# Patient Record
Sex: FEMALE | Race: WHITE | Employment: OTHER | URBAN - NONMETROPOLITAN AREA
[De-identification: names, ages, dates, MRNs, and addresses within clinical notes are randomized per-mention and may not be internally consistent; named-entity substitution may affect disease eponyms.]

---

## 2020-12-02 LAB — SARS-COV-2: DETECTED

## 2020-12-25 ENCOUNTER — APPOINTMENT (OUTPATIENT)
Dept: GENERAL RADIOLOGY | Age: 75
DRG: 853 | End: 2020-12-25
Attending: INTERNAL MEDICINE
Payer: MEDICARE

## 2020-12-25 ENCOUNTER — APPOINTMENT (OUTPATIENT)
Dept: CT IMAGING | Age: 75
DRG: 853 | End: 2020-12-25
Attending: INTERNAL MEDICINE
Payer: MEDICARE

## 2020-12-25 ENCOUNTER — ANESTHESIA EVENT (OUTPATIENT)
Dept: OPERATING ROOM | Age: 75
DRG: 853 | End: 2020-12-25
Payer: MEDICARE

## 2020-12-25 ENCOUNTER — ANESTHESIA (OUTPATIENT)
Dept: OPERATING ROOM | Age: 75
DRG: 853 | End: 2020-12-25
Payer: MEDICARE

## 2020-12-25 ENCOUNTER — HOSPITAL ENCOUNTER (INPATIENT)
Age: 75
LOS: 5 days | Discharge: HOME | DRG: 853 | End: 2020-12-30
Attending: INTERNAL MEDICINE | Admitting: INTERNAL MEDICINE
Payer: MEDICARE

## 2020-12-25 VITALS — DIASTOLIC BLOOD PRESSURE: 68 MMHG | OXYGEN SATURATION: 96 % | SYSTOLIC BLOOD PRESSURE: 150 MMHG

## 2020-12-25 DIAGNOSIS — R65.21 SEPTIC SHOCK (HCC): Primary | ICD-10-CM

## 2020-12-25 DIAGNOSIS — A41.9 SEPTIC SHOCK (HCC): Primary | ICD-10-CM

## 2020-12-25 LAB
ACT TEG: 128 SECONDS (ref 86–118)
ALBUMIN SERPL-MCNC: 2.8 G/DL (ref 3.5–5.1)
ALP BLD-CCNC: 119 U/L (ref 38–126)
ALT SERPL-CCNC: 23 U/L (ref 11–66)
ANGLE, RAPID TEG: 71 DEG (ref 64–80)
ANION GAP SERPL CALCULATED.3IONS-SCNC: 13 MEQ/L (ref 8–16)
AST SERPL-CCNC: 33 U/L (ref 5–40)
BASOPHILS # BLD: 0.3 %
BASOPHILS ABSOLUTE: 0 THOU/MM3 (ref 0–0.1)
BILIRUB SERPL-MCNC: 1.2 MG/DL (ref 0.3–1.2)
BILIRUBIN DIRECT: 0.9 MG/DL (ref 0–0.3)
BUN BLDV-MCNC: 51 MG/DL (ref 7–22)
CALCIUM SERPL-MCNC: 7.6 MG/DL (ref 8.5–10.5)
CHLORIDE BLD-SCNC: 103 MEQ/L (ref 98–111)
CO2: 18 MEQ/L (ref 23–33)
CORTISOL: 35.07 UG/DL
CREAT SERPL-MCNC: 3.1 MG/DL (ref 0.4–1.2)
DIFFERENTIAL TYPE: ABNORMAL
DIRECT COOMBS: NORMAL
EKG ATRIAL RATE: 88 BPM
EKG P AXIS: 7 DEGREES
EKG P-R INTERVAL: 146 MS
EKG Q-T INTERVAL: 382 MS
EKG QRS DURATION: 86 MS
EKG QTC CALCULATION (BAZETT): 462 MS
EKG R AXIS: 49 DEGREES
EKG T AXIS: 52 DEGREES
EKG VENTRICULAR RATE: 88 BPM
EOSINOPHIL # BLD: 0.5 %
EOSINOPHILS ABSOLUTE: 0 THOU/MM3 (ref 0–0.4)
EPL-TEG: 0 % (ref 0–15)
ERYTHROCYTE [DISTWIDTH] IN BLOOD BY AUTOMATED COUNT: 12.6 % (ref 11.5–14.5)
ERYTHROCYTE [DISTWIDTH] IN BLOOD BY AUTOMATED COUNT: 46.7 FL (ref 35–45)
FIBRINOGEN: 350 MG/100ML (ref 155–475)
GFR SERPL CREATININE-BSD FRML MDRD: 15 ML/MIN/1.73M2
GLUCOSE BLD-MCNC: 119 MG/DL (ref 70–108)
HCT VFR BLD CALC: 35.1 % (ref 37–47)
HEMOGLOBIN: 11.2 GM/DL (ref 12–16)
HEPARIN THERAPY: NO
IMMATURE GRANS (ABS): 0.98 THOU/MM3 (ref 0–0.07)
IMMATURE GRANULOCYTES: 25 %
KINETICS RAPID TEG: 2.5 MINUTES (ref 0.5–2.3)
LACTIC ACID, SEPSIS: 1.3 MMOL/L (ref 0.5–1.9)
LACTIC ACID, SEPSIS: 2.7 MMOL/L (ref 0.5–1.9)
LACTIC ACID: 3.2 MMOL/L (ref 0.5–2.2)
LD: 244 U/L (ref 100–190)
LIPASE: 4.8 U/L (ref 5.6–51.3)
LY30 (LYSIS) TEG: 0 % (ref 0–7.5)
LYMPHOCYTES # BLD: 2.8 %
LYMPHOCYTES ABSOLUTE: 0.1 THOU/MM3 (ref 1–4.8)
MA(MAX CLOT) RAPID TEG: 45.7 MM (ref 52–71)
MAGNESIUM: 1.5 MG/DL (ref 1.6–2.4)
MCH RBC QN AUTO: 32.3 PG (ref 26–33)
MCHC RBC AUTO-ENTMCNC: 31.9 GM/DL (ref 32.2–35.5)
MCV RBC AUTO: 101.2 FL (ref 81–99)
MONOCYTES # BLD: 2 %
MONOCYTES ABSOLUTE: 0.1 THOU/MM3 (ref 0.4–1.3)
MRSA SCREEN RT-PCR: POSITIVE
NUCLEATED RED BLOOD CELLS: 0 /100 WBC
PATHOLOGIST REVIEW: ABNORMAL
PHOSPHORUS: 4.3 MG/DL (ref 2.4–4.7)
PLATELET # BLD: 31 THOU/MM3 (ref 130–400)
PLATELET ESTIMATE: ABNORMAL
PMV BLD AUTO: 11 FL (ref 9.4–12.4)
POTASSIUM SERPL-SCNC: 3.6 MEQ/L (ref 3.5–5.2)
PROCALCITONIN: 94.67 NG/ML (ref 0.01–0.09)
RBC # BLD: 3.47 MILL/MM3 (ref 4.2–5.4)
REACTION TIME RAPID TEG: 0.8 MINUTES (ref 0.4–1)
REVIEWED BY: NORMAL
SCAN OF BLOOD SMEAR: NORMAL
SEG NEUTROPHILS: 69.4 %
SEGMENTED NEUTROPHILS ABSOLUTE COUNT: 2.7 THOU/MM3 (ref 1.8–7.7)
SMEAR REVIEW: NORMAL
SODIUM BLD-SCNC: 134 MEQ/L (ref 135–145)
TOTAL PROTEIN: 4.5 G/DL (ref 6.1–8)
VANCOMYCIN RESISTANT ENTEROCOCCUS: NEGATIVE
WBC # BLD: 3.9 THOU/MM3 (ref 4.8–10.8)

## 2020-12-25 PROCEDURE — 93010 ELECTROCARDIOGRAM REPORT: CPT | Performed by: NUCLEAR MEDICINE

## 2020-12-25 PROCEDURE — 2700000000 HC OXYGEN THERAPY PER DAY

## 2020-12-25 PROCEDURE — 85025 COMPLETE CBC W/AUTO DIFF WBC: CPT

## 2020-12-25 PROCEDURE — 85385 FIBRINOGEN ANTIGEN: CPT

## 2020-12-25 PROCEDURE — 2000000000 HC ICU R&B

## 2020-12-25 PROCEDURE — 83735 ASSAY OF MAGNESIUM: CPT

## 2020-12-25 PROCEDURE — C2617 STENT, NON-COR, TEM W/O DEL: HCPCS | Performed by: UROLOGY

## 2020-12-25 PROCEDURE — 3600000013 HC SURGERY LEVEL 3 ADDTL 15MIN: Performed by: UROLOGY

## 2020-12-25 PROCEDURE — C1769 GUIDE WIRE: HCPCS | Performed by: UROLOGY

## 2020-12-25 PROCEDURE — 2580000003 HC RX 258: Performed by: NURSE ANESTHETIST, CERTIFIED REGISTERED

## 2020-12-25 PROCEDURE — 83010 ASSAY OF HAPTOGLOBIN QUANT: CPT

## 2020-12-25 PROCEDURE — 3600000003 HC SURGERY LEVEL 3 BASE: Performed by: UROLOGY

## 2020-12-25 PROCEDURE — 6360000002 HC RX W HCPCS: Performed by: NURSE PRACTITIONER

## 2020-12-25 PROCEDURE — 3700000000 HC ANESTHESIA ATTENDED CARE: Performed by: UROLOGY

## 2020-12-25 PROCEDURE — 36591 DRAW BLOOD OFF VENOUS DEVICE: CPT

## 2020-12-25 PROCEDURE — 87077 CULTURE AEROBIC IDENTIFY: CPT

## 2020-12-25 PROCEDURE — 80048 BASIC METABOLIC PNL TOTAL CA: CPT

## 2020-12-25 PROCEDURE — 87641 MR-STAPH DNA AMP PROBE: CPT

## 2020-12-25 PROCEDURE — 0T778DZ DILATION OF LEFT URETER WITH INTRALUMINAL DEVICE, VIA NATURAL OR ARTIFICIAL OPENING ENDOSCOPIC: ICD-10-PCS | Performed by: INTERNAL MEDICINE

## 2020-12-25 PROCEDURE — 83615 LACTATE (LD) (LDH) ENZYME: CPT

## 2020-12-25 PROCEDURE — 2500000003 HC RX 250 WO HCPCS: Performed by: NURSE PRACTITIONER

## 2020-12-25 PROCEDURE — 84100 ASSAY OF PHOSPHORUS: CPT

## 2020-12-25 PROCEDURE — 2709999900 HC NON-CHARGEABLE SUPPLY: Performed by: UROLOGY

## 2020-12-25 PROCEDURE — 6360000002 HC RX W HCPCS: Performed by: NURSE ANESTHETIST, CERTIFIED REGISTERED

## 2020-12-25 PROCEDURE — 71045 X-RAY EXAM CHEST 1 VIEW: CPT

## 2020-12-25 PROCEDURE — 6370000000 HC RX 637 (ALT 250 FOR IP): Performed by: NURSE PRACTITIONER

## 2020-12-25 PROCEDURE — 83605 ASSAY OF LACTIC ACID: CPT

## 2020-12-25 PROCEDURE — 87086 URINE CULTURE/COLONY COUNT: CPT

## 2020-12-25 PROCEDURE — 2580000003 HC RX 258: Performed by: NURSE PRACTITIONER

## 2020-12-25 PROCEDURE — 87081 CULTURE SCREEN ONLY: CPT

## 2020-12-25 PROCEDURE — APPNB180 APP NON BILLABLE TIME > 60 MINS: Performed by: NURSE PRACTITIONER

## 2020-12-25 PROCEDURE — 3700000001 HC ADD 15 MINUTES (ANESTHESIA): Performed by: UROLOGY

## 2020-12-25 PROCEDURE — 74176 CT ABD & PELVIS W/O CONTRAST: CPT

## 2020-12-25 PROCEDURE — 83690 ASSAY OF LIPASE: CPT

## 2020-12-25 PROCEDURE — 99291 CRITICAL CARE FIRST HOUR: CPT | Performed by: INTERNAL MEDICINE

## 2020-12-25 PROCEDURE — 87500 VANOMYCIN DNA AMP PROBE: CPT

## 2020-12-25 PROCEDURE — 82533 TOTAL CORTISOL: CPT

## 2020-12-25 PROCEDURE — 84145 PROCALCITONIN (PCT): CPT

## 2020-12-25 PROCEDURE — 93005 ELECTROCARDIOGRAM TRACING: CPT | Performed by: NURSE PRACTITIONER

## 2020-12-25 PROCEDURE — 2500000003 HC RX 250 WO HCPCS: Performed by: NURSE ANESTHETIST, CERTIFIED REGISTERED

## 2020-12-25 PROCEDURE — 87040 BLOOD CULTURE FOR BACTERIA: CPT

## 2020-12-25 PROCEDURE — 87147 CULTURE TYPE IMMUNOLOGIC: CPT

## 2020-12-25 PROCEDURE — 71250 CT THORAX DX C-: CPT

## 2020-12-25 PROCEDURE — 80076 HEPATIC FUNCTION PANEL: CPT

## 2020-12-25 PROCEDURE — 86880 COOMBS TEST DIRECT: CPT

## 2020-12-25 PROCEDURE — 94761 N-INVAS EAR/PLS OXIMETRY MLT: CPT

## 2020-12-25 PROCEDURE — 87186 SC STD MICRODIL/AGAR DIL: CPT

## 2020-12-25 DEVICE — URETERAL STENT
Type: IMPLANTABLE DEVICE | Status: FUNCTIONAL
Brand: PERCUFLEX™ PLUS

## 2020-12-25 RX ORDER — 0.9 % SODIUM CHLORIDE 0.9 %
2000 INTRAVENOUS SOLUTION INTRAVENOUS ONCE
Status: COMPLETED | OUTPATIENT
Start: 2020-12-25 | End: 2020-12-25

## 2020-12-25 RX ORDER — GLYCOPYRROLATE 1 MG/5 ML
SYRINGE (ML) INTRAVENOUS PRN
Status: DISCONTINUED | OUTPATIENT
Start: 2020-12-25 | End: 2020-12-25 | Stop reason: SDUPTHER

## 2020-12-25 RX ORDER — SODIUM CHLORIDE 9 MG/ML
INJECTION, SOLUTION INTRAVENOUS CONTINUOUS PRN
Status: DISCONTINUED | OUTPATIENT
Start: 2020-12-25 | End: 2020-12-25 | Stop reason: SDUPTHER

## 2020-12-25 RX ORDER — HEPARIN SODIUM 5000 [USP'U]/ML
5000 INJECTION, SOLUTION INTRAVENOUS; SUBCUTANEOUS 2 TIMES DAILY
Status: DISCONTINUED | OUTPATIENT
Start: 2020-12-25 | End: 2020-12-25

## 2020-12-25 RX ORDER — KETAMINE HYDROCHLORIDE 50 MG/ML
INJECTION, SOLUTION, CONCENTRATE INTRAMUSCULAR; INTRAVENOUS PRN
Status: DISCONTINUED | OUTPATIENT
Start: 2020-12-25 | End: 2020-12-25 | Stop reason: SDUPTHER

## 2020-12-25 RX ORDER — ACETAMINOPHEN 325 MG/1
650 TABLET ORAL EVERY 6 HOURS PRN
Status: DISCONTINUED | OUTPATIENT
Start: 2020-12-25 | End: 2020-12-30 | Stop reason: HOSPADM

## 2020-12-25 RX ORDER — MIDAZOLAM HYDROCHLORIDE 1 MG/ML
INJECTION INTRAMUSCULAR; INTRAVENOUS PRN
Status: DISCONTINUED | OUTPATIENT
Start: 2020-12-25 | End: 2020-12-25 | Stop reason: SDUPTHER

## 2020-12-25 RX ORDER — ONDANSETRON 2 MG/ML
4 INJECTION INTRAMUSCULAR; INTRAVENOUS EVERY 6 HOURS PRN
Status: DISCONTINUED | OUTPATIENT
Start: 2020-12-25 | End: 2020-12-30 | Stop reason: HOSPADM

## 2020-12-25 RX ORDER — POLYETHYLENE GLYCOL 3350 17 G/17G
17 POWDER, FOR SOLUTION ORAL DAILY PRN
Status: DISCONTINUED | OUTPATIENT
Start: 2020-12-25 | End: 2020-12-30 | Stop reason: HOSPADM

## 2020-12-25 RX ORDER — ACETAMINOPHEN 650 MG/1
650 SUPPOSITORY RECTAL EVERY 6 HOURS PRN
Status: DISCONTINUED | OUTPATIENT
Start: 2020-12-25 | End: 2020-12-30 | Stop reason: HOSPADM

## 2020-12-25 RX ORDER — DEXAMETHASONE SODIUM PHOSPHATE 10 MG/ML
10 INJECTION, SOLUTION INTRAMUSCULAR; INTRAVENOUS ONCE
Status: COMPLETED | OUTPATIENT
Start: 2020-12-25 | End: 2020-12-25

## 2020-12-25 RX ORDER — ONDANSETRON 4 MG/1
4 TABLET, FILM COATED ORAL EVERY 8 HOURS PRN
Status: ON HOLD | COMMUNITY
End: 2020-12-28 | Stop reason: ALTCHOICE

## 2020-12-25 RX ORDER — PROMETHAZINE HYDROCHLORIDE 25 MG/1
12.5 TABLET ORAL EVERY 6 HOURS PRN
Status: DISCONTINUED | OUTPATIENT
Start: 2020-12-25 | End: 2020-12-30 | Stop reason: HOSPADM

## 2020-12-25 RX ORDER — HYDROCODONE BITARTRATE AND ACETAMINOPHEN 5; 325 MG/1; MG/1
1 TABLET ORAL EVERY 6 HOURS PRN
Status: ON HOLD | COMMUNITY
End: 2020-12-28 | Stop reason: ALTCHOICE

## 2020-12-25 RX ORDER — CARVEDILOL 3.12 MG/1
3.12 TABLET ORAL 2 TIMES DAILY WITH MEALS
Status: ON HOLD | COMMUNITY
End: 2020-12-30 | Stop reason: HOSPADM

## 2020-12-25 RX ADMIN — Medication 0.2 MG: at 07:41

## 2020-12-25 RX ADMIN — VASOPRESSIN 0.04 UNITS/MIN: 20 INJECTION INTRAVENOUS at 19:50

## 2020-12-25 RX ADMIN — SODIUM CHLORIDE: 9 INJECTION, SOLUTION INTRAVENOUS at 07:41

## 2020-12-25 RX ADMIN — SODIUM CHLORIDE 2000 ML: 9 INJECTION, SOLUTION INTRAVENOUS at 02:45

## 2020-12-25 RX ADMIN — ACETAMINOPHEN 650 MG: 325 TABLET ORAL at 23:34

## 2020-12-25 RX ADMIN — KETAMINE HYDROCHLORIDE 10 MG: 50 INJECTION, SOLUTION INTRAMUSCULAR; INTRAVENOUS at 07:41

## 2020-12-25 RX ADMIN — Medication 40 MCG/MIN: at 02:46

## 2020-12-25 RX ADMIN — DEXAMETHASONE SODIUM PHOSPHATE 10 MG: 10 INJECTION, SOLUTION INTRAMUSCULAR; INTRAVENOUS at 06:58

## 2020-12-25 RX ADMIN — VASOPRESSIN 0.04 UNITS/MIN: 20 INJECTION INTRAVENOUS at 02:46

## 2020-12-25 RX ADMIN — PIPERACILLIN AND TAZOBACTAM 3.38 G: 3; .375 INJECTION, POWDER, LYOPHILIZED, FOR SOLUTION INTRAVENOUS at 13:42

## 2020-12-25 RX ADMIN — MIDAZOLAM HYDROCHLORIDE 1 MG: 1 INJECTION, SOLUTION INTRAMUSCULAR; INTRAVENOUS at 07:41

## 2020-12-25 ASSESSMENT — PULMONARY FUNCTION TESTS
PIF_VALUE: 1
PIF_VALUE: 0
PIF_VALUE: 1
PIF_VALUE: 1
PIF_VALUE: 0
PIF_VALUE: 1
PIF_VALUE: 0
PIF_VALUE: 1
PIF_VALUE: 0
PIF_VALUE: 1
PIF_VALUE: 0
PIF_VALUE: 1
PIF_VALUE: 0
PIF_VALUE: 1
PIF_VALUE: 0

## 2020-12-25 NOTE — FLOWSHEET NOTE
Report called to 4D. Transferred to 4D04 per bed with RN, monitor, O2 and IV's. 4D nurse states will call pt's daughter on arrival so pt can talk to her.

## 2020-12-25 NOTE — CONSULTS
Ulises Kauffman MD  Urology Consultation    Patient:  Gerard Grajeda  MRN: [id number]  YOB: 1945    CHIEF COMPLAINT:  ureteral stone    HISTORY OF PRESENT ILLNESS:     The patient is a 67 y.o. female who presents with sepsis . Urology consulted for ureteral stone    Onset was days ago  Overall, the problem(s) are worse. Severity is described as profound. Associated Symptoms: No dysuria, no gross hematuria. Current Pain Severity: 4    Left flank pain  Transfer from [place]  Films not available but report is  Septic left ureteral stones      Patient's old records reviewed. Pertinent patient notes and patient chart reviewed and summarized above.     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        Past Surgical History:    Past Surgical History:   Procedure Laterality Date    MASTECTOMY Right 01/15/2019    MASTECTOMY Left 04/07/2020       Medications:    Scheduled Meds:   piperacillin-tazobactam  3.375 g Intravenous Q12H     Continuous Infusions:   norepinephrine 32 mcg/min (12/25/20 0713)    vasopressin (Septic Shock) infusion 0.04 Units/min (12/25/20 0246)     PRN Meds:.promethazine **OR** ondansetron, polyethylene glycol, acetaminophen **OR** acetaminophen    Allergies:  Bacitracin and Bactrim [sulfamethoxazole-trimethoprim]    Social History:    Social History     Socioeconomic History    Marital status:      Spouse name: Not on file    Number of children: Not on file    Years of education: Not on file    Highest education level: Not on file   Occupational History    Not on file   Social Needs    Financial resource strain: Not on file    Food insecurity     Worry: Not on file     Inability: Not on file    Transportation needs     Medical: Not on file     Non-medical: Not on file   Tobacco Use    Smoking status: Not on file   Substance and Sexual Activity    Alcohol use: Not on file 12/25/20 0300 (!) 102/58   92 14 100 %     12/25/20 0255 (!) 95/47   94 15 99 %     12/25/20 0250 (!) 96/50   91 16 96 %     12/25/20 0245 (!) 90/49   95 17 96 %     12/25/20 0240 (!) 92/48   104 18      12/25/20 0230 98/62     (!) 89 %     12/25/20 0220 (!) 105/52   112 18 93 %     12/25/20 0215 99/64   109 17 91 %     12/25/20 0213 (!) 92/51   106 17 90 %     12/25/20 0210 (!) 95/51   114 27 93 %     12/25/20 0205 101/65   114 24 90 %     12/25/20 0152 (!) 102/47 99.9 °F (37.7 °C) Oral 117 18 94 %  185 lb 6.5 oz (84.1 kg)     Constitutional: Patient in no acute distress. Neuro: Alert and oriented to person, place and time. Psych: mood and affect normal  HEENT negative  Lungs: Respiratory effort is normal  Cardiovascular: Normal peripheral pulses. Regular rate  Abdomen: Soft, non-tender, non-distended with no CVA, flank pain or hepatosplenomegaly. No hernias. Kidneys normal.  Lymphatics: No palpable lymphadenopathy. Bladder non-tender and not distended. Pelvic exam: deferred  Rectal exam not indicated  Minimal/no edema in bilateral lower extremities. LABS:   Recent Labs     12/25/20  0510   WBC 3.9*   HGB 11.2*   HCT 35.1*   .2*   PLT 31*     Recent Labs     12/25/20  0510   *   K 3.6      CO2 18*   BUN 51*   CREATININE 3.1*       Additional Lab/Culture results: none    Urinalysis: No results for input(s): COLORU, PHUR, LABCAST, WBCUA, RBCUA, MUCUS, TRICHOMONAS, YEAST, BACTERIA, CLARITYU, SPECGRAV, LEUKOCYTESUR, UROBILINOGEN, BILIRUBINUR, BLOODU in the last 72 hours.     Invalid input(s): Lucien Moreno     -----------------------------------------------------------------  Imaging Reviewed during this encounter:   Rohith Schwartz MD independently reviewed the images and verified the radiology reports from:      CT REPORT- left ureteral stones near UVJ    Xr Chest Portable    Result Date: 12/25/2020

## 2020-12-25 NOTE — PROGRESS NOTES
Mauricio,Dr Nelson. Pyelonephritis,septic shock on levophed, not intubated. Ct Abd. 2mm stone distal L ureter L hydronephrosis. Creatinine 3.76, was 0.84 yesterday. Lactate 3.3, Trop 0.38, PLT 53, WBC 3.6 29% bands. Pt has a mediport,currently getting radiation at [facility] for breast CA. AMS,weak,slow to respond. SBP was in 60's, now on levo 92/54, , T 99.2.  Spoke with Dr Ruth Feliciano for urology, he plans to take pt to OR at 6AM.

## 2020-12-25 NOTE — OP NOTE
Patient:  Oneyda Urbina  MRN: [id number]  YOB: 1945    FACILITY: [place], [place]    DATE: 12/25/2020    SURGEON: Terrance Ojeda MD     ASSISTANT: none    PREOPERATIVE DIAGNOSIS: kidney stone    POSTOPERATIVE DIAGNOSIS: kidney stone    PROCEDURE PERFORMED:     1. Cystourethroscopy  2. Left stent placement    ANESTHESIA: mac    ESTIMATED BLOOD LOSS: 0 ml    COMPLICATIONS: None immediate    DRAINS: 6 x 26 double j stent    SPECIMENS: none    INDICATIONS FOR PROCEDURE:  The patient is a 67 y.o. female who presents today with kidney stone here for CYSTOSCOPY URETERAL STENT INSERTION. After risks, benefits and alternatives of the procedure were discussed with the patient, the patient elected to proceed. DETAILS OF THE PROCEDURE:  The patient was brought back to the preoperative holding area to the operating suite, and was transferred to the operating table where the patient lay in supine position. General endotracheal anesthesia was induced, and patient was prepped and draped in standard surgical fashion after being placed in dorsolithotomy position. A proper timeout was performed, preoperative antibiotics were given. A rigid cystoscope with a 22 Swedish sheath with 30 degree lens was inserted in the patient's urethral and into the bladder with ease. We then focused our attention on the Left ureteral orifice, which we cannulated with our glidewire. Over our glidewire we placed a   6x 26 stent and we noted appropriate placement in the upper collecting system using fluoroscopy. We then removed our wire. There was good proximal and distal curl. We did not leave the string at the end of the stent. We then drained the patient's bladder and removed the scope and the procedure was subsequently terminated. I was present and scrubbed for all critical portions of the procedure.     Plan:   The patient will be monitored closely in ICU

## 2020-12-25 NOTE — PROGRESS NOTES
Patient arrived to unit from ED via EMS and ER nurse. Patient transferred to ICU bed and placed on continuous ICU bedside monitor. Patient admitted for Severe sepsis with septic shock [R65.21]. Vitals obtained. See flowsheets. Patient's IV access includes mediport and piv x2. Current infusions and rates of infusion include levo 30. Assessment completed by Tayler Raman. Two nurse skin assessment completed by Tayler Raman and Kathleen Hall RN. See flowsheets for assessment details. Policies and procedures of ICU able to be explained to patient at this time. Family member(s)/representative(s) present at time of admission include family updated via phone, daughter and . Unable to be at bedside d/t COVID isolation. Patient rights explained to family member(s)/representatives and patient, as able. Patient/patient's family member(s)/representative(s) Requested to have physician notified of their admission. All questions posed by patient's family member(s)/representative(s) and patient answered at this time.

## 2020-12-25 NOTE — H&P
CRITICAL CARE H+P NOTE      Patient:  Niharika Oneal    Unit/Bed:3A-09/009-A  YOB: 1945  MRN: [id number]   PCP: Oralia Reyez MD  Date of Admission: 12/25/2020  Chief Complaint:-Hypotension, decreased urinary output    Assessment and Plan:    1. Distributive shock:  Patient was given 0.9NS 30/kg bolus with no improvement in blood pressure. Patient did require Levophed and Vasopressin to maintain MAP >65.   2. Acute pyelonephritis:  12/23/2020 CT of ABD/PELVIS  Moderate left perinephric edema, Urine Culture and Blood Culture is pending. Patient did receive Vancomycin and Zosyn at outlying facility prior to transfer, will continue Zosyn. 3. Left ureterolithiasis with hydronephrosis/hydroureter:  CT of ABD/Pelvis 12/23/2020 multiple calculi within the distal left ureter and within the left ureterovesicular junction each measuring about 2 mm. Mild to moderate left hydronephrosis and moderate left perinephric edema. Urology has been consulted, anticipated OR intervention. 4. Incidental COVID-19:  Positive. Patient is telling she tested positive in the past?. Continue to monitor. 5. Acute kidney injury: oliguric, ATN vs obstructive , Amaya was placed at outlying facility. Persistent hypotension secondary to sepsis. Urine is pending. Will need to dose medications to GFR, no NSAIDS to be given. May need Nephrology input. 6. Elevated troponin:  In the setting of JOEL and Septic Shock, likely demand ischemia. EKG no STEMI, repeat troponin and EKG are pending. 7. Acute thrombocytopenia:  Multifactorial likely secondary to sepsis will need to rule out DIC, or TTP  8. Anemia, macrocytis:  Monitor and trend  9. Breast cancer, history:  Stage II, history of mediport, and left modified radical mastectomy on 4/7/2020 and adjunct chemo, she is currently receiving radiation  10.  Obesity:  BMI 30.8      INITIAL H AND P AND ICU COURSE: Social History: Lifetime non-smoker, denies any alcohol or illicit drug use. Patient is  lives with . ROS   GENERAL: Positive for fever, fatigue,   SKN: No lesions or rashes. HEAD: No headaches or recent injury  EYES: No acute changes in vision, no diplopia or blurred vision  EARS: No hearing loss, no tinnitus  NOSE/THROAT: No rhinorrhea or pharyngitis, no nasal drainage  NECK: No lumps or unusual neck stiffness  PULMONARY: No dyspnea, orthopnea or paroxysmal nocturnal dyspnea, stridor or wheezing  CARDIAC: Positive for hypotension and tachycardia, No chest pain, pressure,   GI: Positive for CVA tenderness, No history melena or hematochezia, no diarrhea or constipation  : no hematuria,   PERIPHERAL VASCULAR: No intermittent claudication or unusual leg cramps  MUSCULOSKELETAL: Occasional arthralgias, myalgias  NEUROLOGICAL: Positive for confusion, No Seizures or Syncope  HEMATOLOGIC:  No unusual bruising or bleeding  PSYCH: No homicidal or suicidal ideation. Scheduled Meds:   sodium chloride  2,000 mL Intravenous Once     Continuous Infusions:   norepinephrine      vasopressin (Septic Shock) infusion         PHYSICAL EXAMINATION:  T: 99.9.  P: 117. RR: 22. B/P: 95/51. FiO2: 2 L. O2 Sat: 95.  I/O: Pending  There is no height or weight on file to calculate BMI.   GCS:   12  General:   Pale acutely ill in appearance  HEENT:  normocephalic and atraumatic. No scleral icterus. PERR, dry mucous membranes  Neck: supple. No Thyromegaly. Lungs: clear to auscultation. No retractions, positive for left upper chest MediPort intact. Positive for right breast implant  Cardiac: RRR-tachycardic. No JVD. Abdomen: soft. Nontender. Extremities:  No clubbing, cyanosis, or edema x 4. Vasculature: capillary refill < 3 seconds. Palpable dorsalis pedis pulses. Skin:  warm and dry. Psych:  Alert and oriented x3. Affect appropriate  Lymph:  No supraclavicular adenopathy. Neurologic:  No focal deficit. No seizures. Data: (All radiographs, tracings, PFTs, and imaging are personally viewed and interpreted unless otherwise noted). ? 12/24/2020 information obtained from outlying facility  ? Urine-cloudy negative for glucose negative for ketones 1+ blood 1+ leukocytes  WBCs 3+ bacteria  ? Urine drug screen negative  ? EtOH less than 3.0  ? Glucose 118 BUN 37 creatinine is 3.76  ? Sodium 133 potassium 3.5 chloride 78 CO2 24 calcium is 8.5  ? White count 3.6 hemoglobin is 12.8 hematocrit is 39.1 platelet count is 53  ? Troponin 0 0.38  ? INR 1.2  ? Total bili is 1.0 albumin is 2.6 alk phos is 321 AST 40 ALT 37  ? Lactic acid is 3.3  ? CT head no acute intracranial abnormality  ? Chest x-ray asymmetric right mid and lower lung zone hazy opacity may relate to overlying right breast tissue implant  ? EKG sinus tachycardia heart rate 105 ID is [id number] QTC is 486 with no acute ST or T wave abnormality  ? Cardiac telemetry sinus tachycardia        Seen with multidisciplinary ICU team yes. Meets Continued ICU Level Care Criteria:    [x] Yes   [] No - Transfer Planned to listed location:  [] HOSPITALIST CONTACTED-      Case and plan discussed with Dr. Deepthi Wright.         Electronically signed by SHAYLA Schneider - [facility]  CRITICAL CARE SPECIALIST

## 2020-12-25 NOTE — ANESTHESIA PRE PROCEDURE
Department of Anesthesiology  Preprocedure Note       Name:  Jasson Saini   Age:  67 y.o.  :  1945                                          MRN:  [id number]         Date:  2020      Surgeon: Ivory Leahy):  Vinod Delaney MD    Procedure: Procedure(s):  CYSTOSCOPY URETERAL STENT INSERTION    Medications prior to admission:   Prior to Admission medications    Medication Sig Start Date End Date Taking? Authorizing Provider   carvedilol (COREG) 3.125 MG tablet Take 3.125 mg by mouth 2 times daily (with meals)   Yes Historical Provider, MD   ondansetron (ZOFRAN) 4 MG tablet Take 4 mg by mouth every 8 hours as needed for Nausea or Vomiting   Yes Historical Provider, MD   HYDROcodone-acetaminophen (NORCO) 5-325 MG per tablet Take 1 tablet by mouth every 6 hours as needed for Pain.    Yes Historical Provider, MD       Current medications:    Current Facility-Administered Medications   Medication Dose Route Frequency Provider Last Rate Last Admin    norepinephrine (LEVOPHED) 16 mg in dextrose 5% 250 mL infusion  2 mcg/min Intravenous Continuous SHAYLA Simms - CNP 30 mL/hr at 20 0713 32 mcg/min at 20 0713    vasopressin 20 Units in dextrose 5 % 100 mL infusion  0.04 Units/min Intravenous Continuous SHAYLA Simms - CNP 12 mL/hr at 20 0246 0.04 Units/min at 20 0246    promethazine (PHENERGAN) tablet 12.5 mg  12.5 mg Oral Q6H PRN SHAYLA Simms CNP        Or    ondansetron (ZOFRAN) injection 4 mg  4 mg Intravenous Q6H PRN Keira Gutierres APRN - CNP        polyethylene glycol (GLYCOLAX) packet 17 g  17 g Oral Daily PRN SHAYLA Simms - CNP        acetaminophen (TYLENOL) tablet 650 mg  650 mg Oral Q6H PRN SHAYLA Simms CNP        Or    acetaminophen (TYLENOL) suppository 650 mg  650 mg Rectal Q6H PRN SHAYLA Simms - ANICETO  piperacillin-tazobactam (ZOSYN) 3.375 g in dextrose 5 % 50 mL IVPB extended infusion (mini-bag)  3.375 g Intravenous Q12H SHAYLA Keith CNP           Allergies: Allergies   Allergen Reactions    Bacitracin     Bactrim [Sulfamethoxazole-Trimethoprim]        Problem List:    Patient Active Problem List   Diagnosis Code    [facility] A41.9, R65.21       Past Medical History:        Diagnosis Date    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        Past Surgical History:        Procedure Laterality Date    MASTECTOMY Right 01/15/2019    MASTECTOMY Left 04/07/2020       Social History:    Social History     Tobacco Use    Smoking status: Not on file   Substance Use Topics    Alcohol use: Not on file                                Counseling given: Not Answered      Vital Signs (Current):   Vitals:    12/25/20 0545 12/25/20 0600 12/25/20 0617 12/25/20 0630   BP: 118/65 (!) 96/57 106/74 126/72   Pulse: 81 93 95 88   Resp: 14 18 16 22   Temp:       TempSrc:       SpO2: 96% 93% 92% 93%   Weight:       Height:                                                  BP Readings from Last 3 Encounters:   12/25/20 126/72       NPO Status:                                                                                 BMI:   Wt Readings from Last 3 Encounters:   12/25/20 185 lb 6.5 oz (84.1 kg)     Body mass index is 30.85 kg/m². CBC:   Lab Results   Component Value Date    WBC 3.9 12/25/2020    RBC 3.47 12/25/2020    HGB 11.2 12/25/2020    HCT 35.1 12/25/2020    .2 12/25/2020    PLT 31 12/25/2020       CMP:   Lab Results   Component Value Date     12/25/2020    K 3.6 12/25/2020     12/25/2020    CO2 18 12/25/2020    BUN 51 12/25/2020    CREATININE 3.1 12/25/2020    LABGLOM 15 12/25/2020    GLUCOSE 119 12/25/2020    CALCIUM 7.6 12/25/2020       POC Tests: No results for input(s): POCGLU, POCNA, POCK, POCCL, POCBUN, POCHEMO, POCHCT in the last 72 hours. Coags: No results found for: PROTIME, INR, APTT    HCG (If Applicable): No results found for: PREGTESTUR, PREGSERUM, HCG, HCGQUANT     ABGs: No results found for: PHART, PO2ART, OIX4ZKB, SFA7ZXC, BEART, Y0QJONDX     Type & Screen (If Applicable):  No results found for: LABABO, LABRH    Drug/Infectious Status (If Applicable):  No results found for: HIV, HEPCAB    COVID-19 Screening (If Applicable): No results found for: COVID19      Anesthesia Evaluation    Airway: Mallampati: II  TM distance: >3 FB   Neck ROM: full  Mouth opening: > = 3 FB Dental:          Pulmonary:   (+) decreased breath sounds,                             Cardiovascular:            Rhythm: regular                      Neuro/Psych:               GI/Hepatic/Renal:   (+) renal disease:,           Endo/Other:                     Abdominal:   (+) obese,         Vascular:                                        Anesthesia Plan      MAC     ASA 3 - emergent     (Possible  [place]  COVID POSITIVE  OBSTRUCTIVE ACUTE RENAL INSUFFICIENCY   SEPSIS)  Induction: intravenous. MIPS: Postoperative opioids intended and Prophylactic antiemetics administered. Anesthetic plan and risks discussed with patient. Plan discussed with CRNA.                   Erin Jones MD   12/25/2020

## 2020-12-25 NOTE — PROGRESS NOTES
Received blood culture prelim results from [facility] FINE - gram negative rods.

## 2020-12-25 NOTE — SIGNIFICANT EVENT
Rolf Ansari is a 76-year-old  female admitted to [facility] ICU on 12/25/2020 as a transfer from [facility]. Patient received treatment for septic shock. Levophed drip infusing via left chest MediPort. Repeat volume status and tissue perfusion assessment:  Vital signs 99.9, respiratory rate 18, heart rate 118, blood pressure 92/50. Cardiopulmonary: S1-S2 tachycardia,   Capillary refill: 3 to 5 seconds  Peripheral pulses: Palpable  Skin: Pale dry, dry mucous membranes  SaO2 equals 90% on room air  Urinary output: Poor-low, 45 mL present via Amaya bag at time of admission. Urine is dark. Amaya present    POCUS: Completed at bedside. No B-lines noted  LV function fairly normal  No pericardial fluid noted  IVC noted collapse with inspiration.

## 2020-12-26 LAB
ANION GAP SERPL CALCULATED.3IONS-SCNC: 13 MEQ/L (ref 8–16)
APTT: 28 SECONDS (ref 22–38)
AVERAGE GLUCOSE: 87 MG/DL (ref 70–126)
BACTERIA: ABNORMAL
BASOPHILS # BLD: 0.2 %
BASOPHILS ABSOLUTE: 0 THOU/MM3 (ref 0–0.1)
BILIRUBIN URINE: NEGATIVE
BLOOD, URINE: ABNORMAL
BUN BLDV-MCNC: 59 MG/DL (ref 7–22)
CALCIUM IONIZED: 1.11 MMOL/L (ref 1.12–1.32)
CALCIUM SERPL-MCNC: 8.1 MG/DL (ref 8.5–10.5)
CASTS: ABNORMAL /LPF
CASTS: ABNORMAL /LPF
CHARACTER, URINE: ABNORMAL
CHLORIDE BLD-SCNC: 101 MEQ/L (ref 98–111)
CO2: 19 MEQ/L (ref 23–33)
COLOR: YELLOW
CREAT SERPL-MCNC: 1.9 MG/DL (ref 0.4–1.2)
CRYSTALS: ABNORMAL
D-DIMER QUANTITATIVE: 6582 NG/ML FEU (ref 0–500)
DOHLE BODIES: PRESENT
EOSINOPHIL # BLD: 0 %
EOSINOPHILS ABSOLUTE: 0 THOU/MM3 (ref 0–0.4)
EPITHELIAL CELLS, UA: ABNORMAL /HPF
ERYTHROCYTE [DISTWIDTH] IN BLOOD BY AUTOMATED COUNT: 12.5 % (ref 11.5–14.5)
ERYTHROCYTE [DISTWIDTH] IN BLOOD BY AUTOMATED COUNT: 45.9 FL (ref 35–45)
FIBRIN SPLIT PRODUCTS: ABNORMAL MCG/ML
FIBRINOGEN: 596 MG/100ML (ref 155–475)
GFR SERPL CREATININE-BSD FRML MDRD: 26 ML/MIN/1.73M2
GLUCOSE BLD-MCNC: 203 MG/DL (ref 70–108)
GLUCOSE, URINE: NEGATIVE MG/DL
HBA1C MFR BLD: 4.9 % (ref 4.4–6.4)
HCT VFR BLD CALC: 32 % (ref 37–47)
HEMOGLOBIN: 10.4 GM/DL (ref 12–16)
IMMATURE GRANS (ABS): 0.08 THOU/MM3 (ref 0–0.07)
IMMATURE GRANULOCYTES: 0.9 %
INR BLD: 1.04 (ref 0.85–1.13)
KETONES, URINE: NEGATIVE
LACTIC ACID: 3.2 MMOL/L (ref 0.5–2.2)
LEUKOCYTE ESTERASE, URINE: ABNORMAL
LYMPHOCYTES # BLD: 3 %
LYMPHOCYTES ABSOLUTE: 0.3 THOU/MM3 (ref 1–4.8)
MAGNESIUM: 2 MG/DL (ref 1.6–2.4)
MCH RBC QN AUTO: 32.5 PG (ref 26–33)
MCHC RBC AUTO-ENTMCNC: 32.5 GM/DL (ref 32.2–35.5)
MCV RBC AUTO: 100 FL (ref 81–99)
MISCELLANEOUS LAB TEST RESULT: ABNORMAL
MONOCYTES # BLD: 3.8 %
MONOCYTES ABSOLUTE: 0.3 THOU/MM3 (ref 0.4–1.3)
NITRITE, URINE: NEGATIVE
NUCLEATED RED BLOOD CELLS: 0 /100 WBC
PH UA: 5.5 (ref 5–9)
PLATELET # BLD: 15 THOU/MM3 (ref 130–400)
PLATELET ESTIMATE: ABNORMAL
PMV BLD AUTO: 13.1 FL (ref 9.4–12.4)
POTASSIUM SERPL-SCNC: 4 MEQ/L (ref 3.5–5.2)
PROCALCITONIN: 45.79 NG/ML (ref 0.01–0.09)
PROTEIN UA: ABNORMAL MG/DL
RBC # BLD: 3.2 MILL/MM3 (ref 4.2–5.4)
RBC URINE: ABNORMAL /HPF
RENAL EPITHELIAL, UA: ABNORMAL
SCAN OF BLOOD SMEAR: NORMAL
SEG NEUTROPHILS: 92.1 %
SEGMENTED NEUTROPHILS ABSOLUTE COUNT: 8 THOU/MM3 (ref 1.8–7.7)
SODIUM BLD-SCNC: 133 MEQ/L (ref 135–145)
SPECIFIC GRAVITY UA: 1.01 (ref 1–1.03)
UROBILINOGEN, URINE: 0.2 EU/DL (ref 0–1)
WBC # BLD: 8.7 THOU/MM3 (ref 4.8–10.8)
WBC UA: ABNORMAL /HPF
YEAST: ABNORMAL

## 2020-12-26 PROCEDURE — 81001 URINALYSIS AUTO W/SCOPE: CPT

## 2020-12-26 PROCEDURE — 85025 COMPLETE CBC W/AUTO DIFF WBC: CPT

## 2020-12-26 PROCEDURE — 2580000003 HC RX 258: Performed by: NURSE PRACTITIONER

## 2020-12-26 PROCEDURE — 85362 FIBRIN DEGRADATION PRODUCTS: CPT

## 2020-12-26 PROCEDURE — 85385 FIBRINOGEN ANTIGEN: CPT

## 2020-12-26 PROCEDURE — 83735 ASSAY OF MAGNESIUM: CPT

## 2020-12-26 PROCEDURE — 6360000002 HC RX W HCPCS: Performed by: NURSE PRACTITIONER

## 2020-12-26 PROCEDURE — 94761 N-INVAS EAR/PLS OXIMETRY MLT: CPT

## 2020-12-26 PROCEDURE — 83036 HEMOGLOBIN GLYCOSYLATED A1C: CPT

## 2020-12-26 PROCEDURE — 6370000000 HC RX 637 (ALT 250 FOR IP): Performed by: NURSE PRACTITIONER

## 2020-12-26 PROCEDURE — 85610 PROTHROMBIN TIME: CPT

## 2020-12-26 PROCEDURE — 80048 BASIC METABOLIC PNL TOTAL CA: CPT

## 2020-12-26 PROCEDURE — 83605 ASSAY OF LACTIC ACID: CPT

## 2020-12-26 PROCEDURE — 84145 PROCALCITONIN (PCT): CPT

## 2020-12-26 PROCEDURE — 2580000003 HC RX 258: Performed by: PHYSICIAN ASSISTANT

## 2020-12-26 PROCEDURE — 99291 CRITICAL CARE FIRST HOUR: CPT | Performed by: INTERNAL MEDICINE

## 2020-12-26 PROCEDURE — 85379 FIBRIN DEGRADATION QUANT: CPT

## 2020-12-26 PROCEDURE — 2060000000 HC ICU INTERMEDIATE R&B

## 2020-12-26 PROCEDURE — 85730 THROMBOPLASTIN TIME PARTIAL: CPT

## 2020-12-26 PROCEDURE — APPSS180 APP SPLIT SHARED TIME > 60 MINUTES: Performed by: NURSE PRACTITIONER

## 2020-12-26 PROCEDURE — 82330 ASSAY OF CALCIUM: CPT

## 2020-12-26 RX ORDER — PANTOPRAZOLE SODIUM 40 MG/1
40 TABLET, DELAYED RELEASE ORAL
Status: DISCONTINUED | OUTPATIENT
Start: 2020-12-27 | End: 2020-12-26

## 2020-12-26 RX ORDER — SODIUM CHLORIDE 9 MG/ML
INJECTION, SOLUTION INTRAVENOUS CONTINUOUS
Status: DISCONTINUED | OUTPATIENT
Start: 2020-12-26 | End: 2020-12-27

## 2020-12-26 RX ADMIN — ACETAMINOPHEN 650 MG: 325 TABLET ORAL at 12:32

## 2020-12-26 RX ADMIN — SODIUM CHLORIDE: 9 INJECTION, SOLUTION INTRAVENOUS at 15:55

## 2020-12-26 RX ADMIN — PIPERACILLIN AND TAZOBACTAM 3.38 G: 3; .375 INJECTION, POWDER, LYOPHILIZED, FOR SOLUTION INTRAVENOUS at 00:59

## 2020-12-26 RX ADMIN — PIPERACILLIN AND TAZOBACTAM 3.38 G: 3; .375 INJECTION, POWDER, LYOPHILIZED, FOR SOLUTION INTRAVENOUS at 12:21

## 2020-12-26 ASSESSMENT — PAIN DESCRIPTION - DESCRIPTORS: DESCRIPTORS: HEADACHE

## 2020-12-26 ASSESSMENT — PAIN SCALES - GENERAL
PAINLEVEL_OUTOF10: 0
PAINLEVEL_OUTOF10: 0

## 2020-12-26 ASSESSMENT — ENCOUNTER SYMPTOMS
SORE THROAT: 0
NAUSEA: 0
PHOTOPHOBIA: 0
ABDOMINAL PAIN: 0
CHEST TIGHTNESS: 0
COLOR CHANGE: 0
WHEEZING: 0
TROUBLE SWALLOWING: 0
BACK PAIN: 0
SHORTNESS OF BREATH: 0
VOMITING: 0

## 2020-12-26 ASSESSMENT — PAIN DESCRIPTION - LOCATION: LOCATION: HEAD

## 2020-12-26 NOTE — PLAN OF CARE
Hospitalist Progress Note      Patient:  Mariia Avery    Unit/Bed:4A-12/012-A  YOB: 1945  MRN: [id number]   Acct: [de-identified]     PCP: Jalen Cervantes MD  Date of Admission: 12/25/2020      Critical lab, platelets 15 without signs of active bleeding. INR/APTT wnl. Fibrinogen elevated, fibrin split products positive, no schistocytes, D-dimer 6k but not CP/SOB/LE edema or pain. Low suspicion for DIC, HHS, TTP. Hgb relatively stable 11.2-10.4. Care everywhere review, platelet count 501 on 12/23/20. Follows with Dr. Hair Rogers, oncologist at [facility] 2/2 invasive ductal cancer right breast with 2 foci of invasive ductal cancer, s/p mastectomy, stage IIa breast cancer. Last seen 12/3/20, Finished Xeloda x8 cycles 10/15/20, continuing with radiation at this time. Transfuse for platelets <36N and actively bleeding or platelet count <69H due to risk of spontaneous bleed. Will consult heme/onc due to extensive breast cx history, chemotherapy treatment, and acute thrombocytopenia although could potentially be sepsis related. Appreciate recommendations.      Alf Lynch PA-C

## 2020-12-26 NOTE — PLAN OF CARE
Problem: Skin Integrity:  Goal: Will show no infection signs and symptoms  Description: Will show no infection signs and symptoms  Outcome: Ongoing  Goal: Absence of new skin breakdown  Description: Absence of new skin breakdown  Outcome: Ongoing     Problem: Infection - Methicillin-Resistant Staphylococcus Aureus Infection:  Goal: Absence of methicillin-resistant Staphylococcus aureus infection  Description: Absence of methicillin-resistant Staphylococcus aureus infection  Outcome: Ongoing     Problem: Falls - Risk of:  Goal: Will remain free from falls  Description: Will remain free from falls  Outcome: Ongoing  Goal: Absence of physical injury  Description: Absence of physical injury  Outcome: Ongoing     Problem: Urinary Elimination:  Goal: Will remain free from infection  Description: Will remain free from infection  Outcome: Ongoing  Care plan reviewed with patient. Patient contributed to goal setting.

## 2020-12-26 NOTE — PROGRESS NOTES
Magnesium is now 2.0 . Clarified with Luis Angel if mag needed replaced as ordered per protocol. He stated to cancel the mag. Mag not given.

## 2020-12-26 NOTE — PROGRESS NOTES
CRITICAL CARE PROGRESS NOTE      Patient:  Peter Vasquez    Unit/Bed:4D-04/004-A  YOB: 1945  MRN: [id number]   PCP: Leigh Mcneal MD  Date of Admission: 12/25/2020  Chief Complaint:- septic shock     Assessment and Plan:      1. Acute pyelonephritis: Continue Zosyn day #2, urology following  2. Bacteremia: Blood culture from outlying facility reported gram-negative rods. Awaiting final culture. Continue Zosyn day #2  3. Left ureterolithiasis with hydronephrosis: Status post stent placement by urology. Amaya remains in place. 4. JOEL: Creatinine was 3.1, on arrival.  Repeat 1.9. Patient making adequate urine. Electrolytes normal.  Monitor. 5. Mild hyponatremia: Mild, monitor  6. Thrombocytopenia: Likely acute. No known heparin exposure. Fibrinogen, fibrinogen split, INR and APTT pending. No active signs of bleeding. Blood smear pending for schistocytes to rule out TTP  7. Non anion metabolic acidosis: Mild, monitor. 8. Acute hyperglycemia: A1c pending, no history of diabetes. Monitor for need for sliding scale insulin. 9. Lactic acidosis: Lactic 3.2 on arrival.  Repeat pending. 10. Macrocytic anemia: Likely chronic no active signs of bleeding. Monitor daily  11. History of bilateral ductal breast cancer: Stage II, radical mastectomy and chemotherapy. She is currently receiving radiation. 12. Recent COVID-19: Tested positive in the past, outside of isolation window  13. Hypomagnesemia: Replacement ordered  14.  Obesity: BMI 30.85      INITIAL H AND P AND ICU COURSE: Endocrine: Negative for polydipsia and polyphagia. Genitourinary: Positive for flank pain. Negative for decreased urine volume. Musculoskeletal: Negative for back pain and neck pain. Skin: Negative for color change, pallor and rash. Allergic/Immunologic: Negative for food allergies. Neurological: Negative for dizziness, weakness and headaches. Hematological: Negative for adenopathy. Psychiatric/Behavioral: Negative for confusion. The patient is not hyperactive. Scheduled Meds:   piperacillin-tazobactam  3.375 g Intravenous Q12H    phosphorus replacement protocol   Other RX Placeholder     Continuous Infusions:    PHYSICAL EXAMINATION:  T:  97.7. P:  54. RR:  11. B/P:  96/63. FiO2:  0. O2 Sat:  94.  I/O: 3465/1150  Body mass index is 30.85 kg/m². GCS: 15  General: Acute on chronically ill-appearing white female  HEENT:  normocephalic and atraumatic. No scleral icterus. PERR  Neck: supple. No Thyromegaly. Lungs: clear to auscultation. No retractions  Cardiac: RRR. No JVD. Abdomen: soft. Nontender. Tender CVA  Extremities:  No clubbing, cyanosis, or edema x 4. Vasculature: capillary refill < 3 seconds. Palpable dorsalis pedis pulses. Skin:  warm and dry. Psych:  Alert and oriented x3. Affect appropriate  Lymph:  No supraclavicular adenopathy. Neurologic:  No focal deficit. No seizures. Data: (All radiographs, tracings, PFTs, and imaging are personally viewed and interpreted unless otherwise noted). ? Sodium 133, potassium 4.0, chloride 101, CO2 19, BUN 59, creatinine 1.9, anion gap 13.0, glucose 203  ? WBC 8.7, hemoglobin 10.4, hematocrit 32.0, platelet count 15  ? Telemetry shows sinus rhythm  ? CT chest 5/25/2020 reports small bilateral pleural effusions  ? CT abdomen pelvis 12/25/2020 reports left ureteral stent in place. ? Chest x-ray 12/25/2020 reports small left pleural effusion  ?  EKG 12/25/2020 reports normal sinus rhythm Meets Continued ICU Level Care Criteria:    [] Yes   [x] No - Transfer Planned to listed location: 4A  [x] HOSPITALIST CONTACTEDSUELLEN Kent    Case and plan discussed with Dr. Shamika Mosley         Electronically signed by Dory Favre.  SHAYLA Alan - CNP  CRITICAL CARE SPECIALIST

## 2020-12-27 LAB
ALBUMIN SERPL-MCNC: 2.4 G/DL (ref 3.5–5.1)
ALP BLD-CCNC: 84 U/L (ref 38–126)
ALT SERPL-CCNC: 17 U/L (ref 11–66)
ANION GAP SERPL CALCULATED.3IONS-SCNC: 9 MEQ/L (ref 8–16)
APTT: 23.3 SECONDS (ref 22–38)
AST SERPL-CCNC: 12 U/L (ref 5–40)
BASOPHILS # BLD: 0 %
BASOPHILS ABSOLUTE: 0 THOU/MM3 (ref 0–0.1)
BILIRUB SERPL-MCNC: 0.4 MG/DL (ref 0.3–1.2)
BILIRUBIN DIRECT: < 0.2 MG/DL (ref 0–0.3)
BUN BLDV-MCNC: 58 MG/DL (ref 7–22)
CALCIUM SERPL-MCNC: 8.4 MG/DL (ref 8.5–10.5)
CHLORIDE BLD-SCNC: 111 MEQ/L (ref 98–111)
CO2: 22 MEQ/L (ref 23–33)
CREAT SERPL-MCNC: 1.3 MG/DL (ref 0.4–1.2)
DIFFERENTIAL, MANUAL: NORMAL
DOHLE BODIES: PRESENT
EOSINOPHIL # BLD: 0 %
EOSINOPHILS ABSOLUTE: 0 THOU/MM3 (ref 0–0.4)
ERYTHROCYTE [DISTWIDTH] IN BLOOD BY AUTOMATED COUNT: 12.4 % (ref 11.5–14.5)
ERYTHROCYTE [DISTWIDTH] IN BLOOD BY AUTOMATED COUNT: 45.1 FL (ref 35–45)
GFR SERPL CREATININE-BSD FRML MDRD: 40 ML/MIN/1.73M2
GLUCOSE BLD-MCNC: 106 MG/DL (ref 70–108)
HAPTOGLOBIN: 131 MG/DL (ref 30–200)
HCT VFR BLD CALC: 29.9 % (ref 37–47)
HEMOGLOBIN: 9.9 GM/DL (ref 12–16)
INR BLD: 0.89 (ref 0.85–1.13)
LACTIC ACID: 1.6 MMOL/L (ref 0.5–2.2)
LYMPHOCYTES # BLD: 3 %
LYMPHOCYTES ABSOLUTE: 0.5 THOU/MM3 (ref 1–4.8)
MCH RBC QN AUTO: 32.6 PG (ref 26–33)
MCHC RBC AUTO-ENTMCNC: 33.1 GM/DL (ref 32.2–35.5)
MCV RBC AUTO: 98.4 FL (ref 81–99)
METAMYELOCYTES: 3 %
MONOCYTES # BLD: 3 %
MONOCYTES ABSOLUTE: 0.5 THOU/MM3 (ref 0.4–1.3)
NUCLEATED RED BLOOD CELLS: 0 /100 WBC
ORGANISM: ABNORMAL
PLATELET # BLD: 15 THOU/MM3 (ref 130–400)
PLATELET ESTIMATE: ABNORMAL
PMV BLD AUTO: 13 FL (ref 9.4–12.4)
POTASSIUM SERPL-SCNC: 3.9 MEQ/L (ref 3.5–5.2)
RBC # BLD: 3.04 MILL/MM3 (ref 4.2–5.4)
REVIEWED BY: NORMAL
SCAN OF BLOOD SMEAR: NORMAL
SEG NEUTROPHILS: 91 %
SEGMENTED NEUTROPHILS ABSOLUTE COUNT: 13.9 THOU/MM3 (ref 1.8–7.7)
SMEAR REVIEW: NORMAL
SODIUM BLD-SCNC: 142 MEQ/L (ref 135–145)
SODIUM BLD-SCNC: 142 MEQ/L (ref 135–145)
TOTAL PROTEIN: 4.5 G/DL (ref 6.1–8)
URINE CULTURE, ROUTINE: ABNORMAL
WBC # BLD: 15.3 THOU/MM3 (ref 4.8–10.8)

## 2020-12-27 PROCEDURE — 2580000003 HC RX 258: Performed by: PHYSICIAN ASSISTANT

## 2020-12-27 PROCEDURE — 6360000002 HC RX W HCPCS: Performed by: NURSE PRACTITIONER

## 2020-12-27 PROCEDURE — 6360000002 HC RX W HCPCS: Performed by: PHYSICIAN ASSISTANT

## 2020-12-27 PROCEDURE — 80053 COMPREHEN METABOLIC PANEL: CPT

## 2020-12-27 PROCEDURE — 85610 PROTHROMBIN TIME: CPT

## 2020-12-27 PROCEDURE — 85025 COMPLETE CBC W/AUTO DIFF WBC: CPT

## 2020-12-27 PROCEDURE — 6370000000 HC RX 637 (ALT 250 FOR IP): Performed by: PHYSICIAN ASSISTANT

## 2020-12-27 PROCEDURE — 84295 ASSAY OF SERUM SODIUM: CPT

## 2020-12-27 PROCEDURE — 85730 THROMBOPLASTIN TIME PARTIAL: CPT

## 2020-12-27 PROCEDURE — 2580000003 HC RX 258: Performed by: NURSE PRACTITIONER

## 2020-12-27 PROCEDURE — 83605 ASSAY OF LACTIC ACID: CPT

## 2020-12-27 PROCEDURE — 99233 SBSQ HOSP IP/OBS HIGH 50: CPT | Performed by: PHYSICIAN ASSISTANT

## 2020-12-27 PROCEDURE — 82248 BILIRUBIN DIRECT: CPT

## 2020-12-27 PROCEDURE — 2060000000 HC ICU INTERMEDIATE R&B

## 2020-12-27 RX ORDER — CARVEDILOL 3.12 MG/1
3.12 TABLET ORAL 2 TIMES DAILY WITH MEALS
Status: DISCONTINUED | OUTPATIENT
Start: 2020-12-27 | End: 2020-12-30 | Stop reason: HOSPADM

## 2020-12-27 RX ADMIN — CARVEDILOL 3.12 MG: 3.12 TABLET, FILM COATED ORAL at 18:27

## 2020-12-27 RX ADMIN — PIPERACILLIN AND TAZOBACTAM 3.38 G: 3; .375 INJECTION, POWDER, LYOPHILIZED, FOR SOLUTION INTRAVENOUS at 01:35

## 2020-12-27 RX ADMIN — PIPERACILLIN AND TAZOBACTAM 3.38 G: 3; .375 INJECTION, POWDER, LYOPHILIZED, FOR SOLUTION INTRAVENOUS at 14:41

## 2020-12-27 RX ADMIN — CEFTRIAXONE SODIUM 2 G: 2 INJECTION, POWDER, FOR SOLUTION INTRAMUSCULAR; INTRAVENOUS at 18:27

## 2020-12-27 ASSESSMENT — PAIN SCALES - GENERAL
PAINLEVEL_OUTOF10: 0

## 2020-12-27 NOTE — PROGRESS NOTES
Hospitalist Progress Note      Patient:  Laura Lopez    Unit/Bed:4A-12/012-A  YOB: 1945  MRN: [id number]   Acct: [de-identified]   PCP: Sony Joyce MD  Date of Admission: 12/25/2020    Assessment/Plan:    1. Acute Pyelonephritis 2/2 left ureterolithiasis with hydronephrosis: Urine culture growing Klebsiella pneumonia. Zosyn initiated 12/25/20, s/p left ureteral stent placement with urology. Amaya remains in place. Lactic trended 3.2-1.6 with IVF, now afebrile. Proclacitonin trending down 94-45.   2. Bacteremia: 2/2 above, reportedly had gram negative rods at outlying facility. Requesting records, repeat blood culture 12/25/20 with no growth to date. 3. JOEL: Cr on arrival 3.1, likely 2/2 above. Amaya cath remains in place, Cr continues to improve down to 1.3.   4. Hyponatremia: Mild, resolved with IVF, since discontinued. Monitor with BMP in AM.   5. Severe Thrombocytopenia: Suspected acute 2/2 sepsis as above. INR/aPTT wnl, fibrinogen elevated, fibrin split products positive, no schistocytes, elevated D-dimer but suspected 2/2 sepsis with no CP/SOB/LE edema or pain. Platelet count  465 on 12/23/20. Hx of stage IIa breast Cx, finished chemo 10/15/20, currently only undergoing radiation. 1. Platelet count stable at 15k, no signs of active bleeding. Hem/Onc consulted for assistance/recommendations. 6. Non-AGMA: Improving, monitor. 7. Macrocytic Anemia: Chronic, Hgb relatively table at 9.9, obtaining B12/folate. No signs of active bleeding. 8. Hx of BL Ductal Breast Cx: Follows in [facility], stage IIa s/p radical mastectomy and chemotherapy, finished eight cycles of Xeloda in 10/2020, has undergone 25 radiation treatments. Heme/onc consulted as above. 9. Recent Covid-19: No SOB or cough. Out of isolation window. 10. Hypomagnesemia: Replaced. 11. Obesity: Body mass index is 30.85 kg/m². Disposition: Transferred out of ICU yesterday. Renal function improving. Amaya cath in place, requesting clarification on duration with urology. Heme/onc consulted with severe thrombocytopenia. Addendum: Reviewed urine/blood culture from [facility] on 12/24/20 both growing pansensitive Klebsiella Pneumoniae. Sensitive to Rocephin, will deescalate antibiotics. Chief Complaint: Septic Shock    Initial H and P:-    Alisson Yeager is a 67-year-old white female who presented to [facility] on 12/25/2020 as a transfer from [address] [place] [address] for complaints of abdominal pain nausea and vomiting. She has a past medical history of lifetime non-smoker, stage II breast cancer, radical mastectomy on 4/7/2020 with chemo. Currently receiving radiation. Does have history of COVID-19 infection November 2020. Per report patient presented to [facility] with complaints abdominal pain nausea and vomiting on 12/23/2020. She was having some dysuria. Patient was given Toradol, Norco, Flomax, Zofran. Urine was noted to be cloudy CT abdomen pelvis was completed without contrast that showed multiple calculi in the distal left ureter and within the left UV junction each measuring about 2 mm. Mild to moderate hydronephrosis was noted. She was discharged home. On 12/24/2020 she presented back to the [address] [place] [address] system emergency department with altered mental status and weakness. Family identified at that time that she was having confusion. Daughter stated that she was have difficulty walking and was saying inappropriate things. He had been prescribed Norco on previous visit but denied that she had taken any. She was transferred to [facility] and underwent stent placement with urology. She was no levophed, which has been stopped. She will transfer to .  \"    Subjective (past 24 hours): Resting comfortably in bed, no new concerns or complaints. Denies any abdominal pain, nausea or vomiting. No hematochezia, melena, urine in graves bag clear yellow. No CP, SOB, fever or chills. Past medical history, family history, social history and allergies reviewed again and is unchanged since admission. ROS (12 point review of systems completed. Pertinent positives noted. Otherwise ROS is negative)     Medications:  Reviewed    Infusion Medications   Scheduled Medications    magnesium replacement protocol   Other RX Placeholder    piperacillin-tazobactam  3.375 g Intravenous Q12H    phosphorus replacement protocol   Other RX Placeholder     PRN Meds: promethazine **OR** ondansetron, polyethylene glycol, acetaminophen **OR** acetaminophen      Intake/Output Summary (Last 24 hours) at 12/27/2020 1159  Last data filed at 12/27/2020 0357  Gross per 24 hour   Intake 1320.14 ml   Output 2800 ml   Net -1479.86 ml       Diet:  DIET RENAL;    Exam:  BP (!) 100/56   Pulse 64   Temp 98 °F (36.7 °C) (Oral)   Resp 16   Ht 5' 5\" (1.651 m)   Wt 185 lb 6.5 oz (84.1 kg)   SpO2 96%   BMI 30.85 kg/m²   General appearance: No apparent distress, appears stated age and cooperative. HEENT: Pupils equal, round, and reactive to light. Conjunctivae/corneas clear. Neck: Supple, with full range of motion. No jugular venous distention. Trachea midline. Respiratory:  Normal respiratory effort. Clear to auscultation, bilaterally without Rales/Wheezes/Rhonchi. Cardiovascular: Regular rate and rhythm with normal S1/S2 without murmurs, rubs or gallops. Abdomen: Soft, non-tender, non-distended with normal bowel sounds. Musculoskeletal: passive and active ROM x 4 extremities. : Graves cath in place, clear yellow urine. Skin: Skin color, texture, turgor normal.  No rashes or lesions. Neurologic:  Neurovascularly intact without any focal sensory/motor deficits.  Cranial nerves: II-XII intact, grossly non-focal. Psychiatric: Alert and oriented, thought content appropriate, normal insight  Capillary Refill: Brisk,< 3 seconds   Peripheral Pulses: +2 palpable, equal bilaterally     Labs:   Recent Labs     12/25/20  0510 12/26/20  0435 12/27/20  0450   WBC 3.9* 8.7 15.3*   HGB 11.2* 10.4* 9.9*   HCT 35.1* 32.0* 29.9*   PLT 31* 15* 15*     Recent Labs     12/25/20  0510 12/25/20  0557 12/26/20  0435 12/27/20  0450   *  --  133* 142   K 3.6  --  4.0 3.9     --  101 111   CO2 18*  --  19* 22*   BUN 51*  --  59* 58*   CREATININE 3.1*  --  1.9* 1.3*   CALCIUM 7.6*  --  8.1* 8.4*   PHOS  --  4.3  --   --      Recent Labs     12/25/20  0557 12/27/20  0450   AST 33 12   ALT 23 17   BILIDIR 0.9* <0.2   BILITOT 1.2 0.4   ALKPHOS 119 84     Recent Labs     12/26/20  0900 12/27/20  0450   INR 1.04 0.89     No results for input(s): CKTOTAL, TROPONINI in the last 72 hours.     Microbiology:    Blood culture #1:   Lab Results   Component Value Date    BC No growth-preliminary  12/25/2020       Blood culture #2:No results found for: Chip Eloy    Organism:  Lab Results   Component Value Date    ORG Klebsiella species 12/25/2020       No results found for: LABGRAM    MRSA culture only:No results found for: [address] [place] [address]    Urine culture:   Lab Results   Component Value Date    LABURIN [place] count: 10,000-50,000 CFU/mL  12/25/2020       Respiratory culture: No results found for: CULTRESP    Aerobic and Anaerobic :  No results found for: LABAERO  No results found for: LABANAE    Urinalysis:      Lab Results   Component Value Date    NITRU NEGATIVE 12/26/2020    WBCUA 25-50 12/26/2020    BACTERIA NONE SEEN 12/26/2020    RBCUA 50-75 12/26/2020    BLOODU LARGE 12/26/2020    [place] 27 1.009 12/26/2020       Radiology:  CT ABDOMEN PELVIS WO CONTRAST Additional Contrast? None   Final Result PROCEDURE: CT ABDOMEN PELVIS WO CONTRAST CLINICAL INFORMATION: abdominal pain . Covid positive. History of breast carcinoma. Ureteral stent. COMPARISON: None. TECHNIQUE: Axial 5 mm CT images were obtained through the abdomen and pelvis. No contrast was given. Coronal and sagittal reconstructions were obtained. All CT scans at this facility use dose modulation, iterative reconstruction, and/or weight-based dosing when appropriate to reduce radiation dose to as low as reasonably achievable. FINDINGS: There are small bilateral pleural effusions and atelectasis or infiltrate at both lung bases. Heart is within appropriate limits. The liver is grossly normal. The spleen is normal. There is increased density in the region of the left adrenal gland. The right adrenal gland and pancreas are grossly normal. The gallbladder is normal.  There is a left ureteric stent in place. There is increased density in the left perinephric fat and surrounding the tail of the pancreas and extending into the left paracolic gutter suggestive of inflammatory process. No abnormalities of the small bowel loops are noted. The IVC and aorta are of normal caliber. There is no adenopathy. There is a Amaya catheter in place and a small amount of free air within the bladder The urinary bladder is normal. There is a small amount of pelvic free fluid. The colon is grossly normal. There is no adenopathy. There is lumbar spondylosis. 1. Left ureteric stent in place. Increased density in the perinephric fat on the left side extending into the tail of the pancreas and left paracolic gutter suggestive of inflammatory process. 2. Amaya catheter in the bladder. Small amount of free air within the bladder. 3. Small amount of free fluid within the pelvis. 4. Lumbar scoliosis. 5. Small bilateral pleural effusions and atelectasis or infiltrate at both lung bases. **This report has been created using voice recognition software. It may contain minor errors which are inherent in voice recognition technology. ** Final report electronically signed by DR Ciro Scales on 12/25/2020 11:34 AM    Ct Chest Wo Contrast    Result Date: 12/25/2020  PROCEDURE: CT CHEST WO CONTRAST CLINICAL INFORMATION: covid. COMPARISON: Chest x-ray obtained on the same day. TECHNIQUE: 5 mm noncontrast axial images were obtained through the chest. Sagittal and coronal reconstructions were obtained. All CT scans at this facility use dose modulation, iterative reconstruction, and/or weight-based dosing when appropriate to reduce radiation dose to as low as reasonably achievable. FINDINGS: There are postoperative changes involving the right and left chest walls and bilateral axillary dissection. There is a right breast implant in place. There is a Port-A-Cath overlying the left hemithorax The heart size is normal. The aorta is of normal caliber. There is no gross abnormality of the pulmonary artery. There is no mediastinal, axillary or hilar adenopathy. There are small bilateral pleural effusions and atelectasis or infiltrates at both lung bases. There is increased density in both lung fields There is mild thoracic spondylosis. 1. Postoperative changes involving the right and left chest walls and axillary regions. 2. Right breast implant in place. Left-sided Port-A-Cath. 3. Small bilateral pleural effusion and atelectasis or infiltrates at both lung bases. 4. Slight increased density in both lung fields. 5. Thoracic spondylosis. **This report has been created using voice recognition software. It may contain minor errors which are inherent in voice recognition technology. ** Final report electronically signed by DR Olya Luong on 12/25/2020 11:25 AM    Xr Chest Portable    Result Date: 12/25/2020  1 view chest x-ray. Comparison: None Findings: Portable AP view. No cardiomegaly. Small left pleural effusion Mild passive venous congestion. Minimal airspace disease both lower lobes. Left Mediport projected at the SVC. Surgical clips both axilla. Osseous structures intact. Impression: 1. Small left pleural effusion.  2.  Mild passive venous congestion and minimal airspace disease lower lobes This document has been electronically signed by: Emiliano Bertrand MD on 12/25/2020 06:02 AM       Electronically signed by Casandra Wells PA-C on 12/27/2020 at 11:59 AM

## 2020-12-27 NOTE — PROGRESS NOTES
[facility] lab called to receive final blood culture results per [facility] request. Results received and faxed to [facility].

## 2020-12-27 NOTE — PROGRESS NOTES
Patient trasnferred from ICU  to 4A Room in a wheelchair. IV x2 saline locked into the RT arm. IV site free of s/s of infection or infiltration. Vital signs obtained. Assessment and data collection initiated. Oriented to room. Policies and procedures for 4a explained All questions answered with no further questions at this time. Fall prevention and safety brochure discussed with patient.

## 2020-12-27 NOTE — PROGRESS NOTES
Patient noted to have consistent PAC's upon review of telemetry. Latha Taylor made aware. He stated he is going to resume carvedilol.

## 2020-12-27 NOTE — PROGRESS NOTES
Collected: 12/25/20 0238  Final result  Specimen: Urine, catheter      Organism Klebsiella speciesAbnormal  Urine Culture, Routine [place] count: 10,000-50,000 CFU/mL             Luis Angel made aware of above. No new orders received.

## 2020-12-28 LAB
ANION GAP SERPL CALCULATED.3IONS-SCNC: 8 MEQ/L (ref 8–16)
BASOPHILS # BLD: 0.2 %
BASOPHILS ABSOLUTE: 0 THOU/MM3 (ref 0–0.1)
BUN BLDV-MCNC: 41 MG/DL (ref 7–22)
CALCIUM SERPL-MCNC: 8.4 MG/DL (ref 8.5–10.5)
CHLORIDE BLD-SCNC: 114 MEQ/L (ref 98–111)
CO2: 22 MEQ/L (ref 23–33)
CREAT SERPL-MCNC: 1.1 MG/DL (ref 0.4–1.2)
EOSINOPHIL # BLD: 0.1 %
EOSINOPHILS ABSOLUTE: 0 THOU/MM3 (ref 0–0.4)
ERYTHROCYTE [DISTWIDTH] IN BLOOD BY AUTOMATED COUNT: 12.7 % (ref 11.5–14.5)
ERYTHROCYTE [DISTWIDTH] IN BLOOD BY AUTOMATED COUNT: 46.1 FL (ref 35–45)
FOLATE: 6.1 NG/ML (ref 4.8–24.2)
GFR SERPL CREATININE-BSD FRML MDRD: 48 ML/MIN/1.73M2
GLUCOSE BLD-MCNC: 84 MG/DL (ref 70–108)
HCT VFR BLD CALC: 33.1 % (ref 37–47)
HEMOGLOBIN: 10.9 GM/DL (ref 12–16)
IMMATURE GRANS (ABS): 0.07 THOU/MM3 (ref 0–0.07)
IMMATURE GRANULOCYTES: 0.5 %
LYMPHOCYTES # BLD: 2.5 %
LYMPHOCYTES ABSOLUTE: 0.4 THOU/MM3 (ref 1–4.8)
MCH RBC QN AUTO: 32.6 PG (ref 26–33)
MCHC RBC AUTO-ENTMCNC: 32.9 GM/DL (ref 32.2–35.5)
MCV RBC AUTO: 99.1 FL (ref 81–99)
MONOCYTES # BLD: 5.5 %
MONOCYTES ABSOLUTE: 0.8 THOU/MM3 (ref 0.4–1.3)
MRSA SCREEN: NORMAL
NUCLEATED RED BLOOD CELLS: 0 /100 WBC
PLATELET # BLD: 20 THOU/MM3 (ref 130–400)
PLATELET ESTIMATE: ABNORMAL
PMV BLD AUTO: 13 FL (ref 9.4–12.4)
POTASSIUM SERPL-SCNC: 4 MEQ/L (ref 3.5–5.2)
RBC # BLD: 3.34 MILL/MM3 (ref 4.2–5.4)
SCAN OF BLOOD SMEAR: NORMAL
SEG NEUTROPHILS: 91.2 %
SEGMENTED NEUTROPHILS ABSOLUTE COUNT: 14 THOU/MM3 (ref 1.8–7.7)
SODIUM BLD-SCNC: 144 MEQ/L (ref 135–145)
VITAMIN B-12: 1393 PG/ML (ref 211–911)
WBC # BLD: 15.4 THOU/MM3 (ref 4.8–10.8)

## 2020-12-28 PROCEDURE — 99232 SBSQ HOSP IP/OBS MODERATE 35: CPT | Performed by: NURSE PRACTITIONER

## 2020-12-28 PROCEDURE — 85025 COMPLETE CBC W/AUTO DIFF WBC: CPT

## 2020-12-28 PROCEDURE — 82746 ASSAY OF FOLIC ACID SERUM: CPT

## 2020-12-28 PROCEDURE — 6360000002 HC RX W HCPCS: Performed by: PHYSICIAN ASSISTANT

## 2020-12-28 PROCEDURE — 51798 US URINE CAPACITY MEASURE: CPT

## 2020-12-28 PROCEDURE — 99232 SBSQ HOSP IP/OBS MODERATE 35: CPT | Performed by: PHYSICIAN ASSISTANT

## 2020-12-28 PROCEDURE — 94760 N-INVAS EAR/PLS OXIMETRY 1: CPT

## 2020-12-28 PROCEDURE — 82607 VITAMIN B-12: CPT

## 2020-12-28 PROCEDURE — 2060000000 HC ICU INTERMEDIATE R&B

## 2020-12-28 PROCEDURE — 2580000003 HC RX 258: Performed by: PHYSICIAN ASSISTANT

## 2020-12-28 PROCEDURE — 6370000000 HC RX 637 (ALT 250 FOR IP): Performed by: PHYSICIAN ASSISTANT

## 2020-12-28 PROCEDURE — 80048 BASIC METABOLIC PNL TOTAL CA: CPT

## 2020-12-28 PROCEDURE — 99222 1ST HOSP IP/OBS MODERATE 55: CPT | Performed by: NURSE PRACTITIONER

## 2020-12-28 RX ORDER — CALCIUM CARBONATE/VITAMIN D3 500MG-5MCG
2 TABLET ORAL DAILY
COMMUNITY

## 2020-12-28 RX ORDER — DIAPER,BRIEF,INFANT-TODD,DISP
EACH MISCELLANEOUS
COMMUNITY
End: 2021-03-09

## 2020-12-28 RX ADMIN — CARVEDILOL 3.12 MG: 3.12 TABLET, FILM COATED ORAL at 08:59

## 2020-12-28 RX ADMIN — CEFTRIAXONE SODIUM 2 G: 2 INJECTION, POWDER, FOR SOLUTION INTRAMUSCULAR; INTRAVENOUS at 16:40

## 2020-12-28 RX ADMIN — CARVEDILOL 3.12 MG: 3.12 TABLET, FILM COATED ORAL at 16:37

## 2020-12-28 ASSESSMENT — PAIN SCALES - GENERAL
PAINLEVEL_OUTOF10: 0

## 2020-12-28 NOTE — PROGRESS NOTES
Pt was transferred from [facility] 12/25 with urosepsis secondary to obstructing stone  Presented covid +, on pressors and was emergently stented  Has seen marked clinical improvement since decompression with stent. Amaya catheter can be removed at discretion of the primary team  We will arrange for outpatient follow up for treatment of the kidney stone in several weeks after she has improved from covid and her septic episode  Please call for questions.

## 2020-12-28 NOTE — PROGRESS NOTES
SHAYLA Villa - CNP  Urology Progress Note    Subjective: Darius Wilson is a 67 y.o. female. s/p CYSTOSCOPY URETERAL STENT INSERTION on 12/25/2020  For multiple left distal ureteral stones with left hydronephrosis by Dr. Jasiel Cm      His/Her current Diet is: DIET RENAL;.    Since the previous note, the patient reports the following:  No acute issues overnight. No fevers or chills. No nausea or vomiting. No chest pain or shortness of breath. No calf pain. Pain Controlled. No hematuria in graves. Tolerating stent well. Ambulating. Reports weakness  Tolerating PO Diet. Poor appetite      Vitals and Labs:  Patient Vitals for the past 24 hrs:   BP Temp Temp src Pulse Resp SpO2 Weight   12/28/20 0859 137/76 98.2 °F (36.8 °C) Oral 66 17 94 %    12/27/20 2346 (!) 119/58 98.2 °F (36.8 °C) Oral 53 16 93 % 197 lb 6.4 oz (89.5 kg)   12/27/20 2000 131/65 96.5 °F (35.8 °C) Oral 65 16 97 %    12/27/20 1630     18     12/27/20 1536 120/74 97.8 °F (36.6 °C) Oral 61 18 95 %    12/27/20 1220     16     12/27/20 1133 (!) 100/56 98 °F (36.7 °C) Oral 64 16 96 %      I/O last 3 completed shifts: In: 3320.5 [P.O.:2140; I.V.:1180.5]  Out: 3300 [Urine:3300]    Recent Labs     12/26/20  0435 12/27/20  0450 12/28/20  0340   WBC 8.7 15.3* 15.4*   HGB 10.4* 9.9* 10.9*   HCT 32.0* 29.9* 33.1*   .0* 98.4 99.1*   PLT 15* 15* 20*     Recent Labs     12/26/20  0435 12/27/20  0450 12/27/20  1839 12/28/20  0340   * 142 142 144   K 4.0 3.9  --  4.0    111  --  114*   CO2 19* 22*  --  22*   BUN 59* 58*  --  41*   CREATININE 1.9* 1.3*  --  1.1       Recent Labs     12/26/20 1930   COLORU YELLOW   PHUR 5.5   LABCAST NONE SEEN  NONE SEEN   WBCUA 25-50   RBCUA 50-75   YEAST NONE SEEN   BACTERIA NONE SEEN   SPECGRAV 1.009   LEUKOCYTESUR LARGE*   UROBILINOGEN 0.2   BILIRUBINUR NEGATIVE   BLOODU LARGE*       Physical Exam:  No acute distress. Awake, alert and oriented.   Neck is supple Regular rate and rhythm. Normal peripheral pulses  No accessory muscles of inspiration. Symmetric chest rise  Abdomen soft, non-tender, non-distended. No CVA tenderness. No calf pain. Minimal/no edema in bilateral lower extremities.   Skin is warm, dry  Psych: mood, affect normal    Additional Lab/Culture results:     Imaging Reviewed:     SHAYLA Jenkins CNP independently reviewed the images and verified the radiology reports from:    Ct Abdomen Pelvis Wo Contrast Additional Contrast? None    Result Date: 12/25/2020 PROCEDURE: CT ABDOMEN PELVIS WO CONTRAST CLINICAL INFORMATION: abdominal pain . Covid positive. History of breast carcinoma. Ureteral stent. COMPARISON: None. TECHNIQUE: Axial 5 mm CT images were obtained through the abdomen and pelvis. No contrast was given. Coronal and sagittal reconstructions were obtained. All CT scans at this facility use dose modulation, iterative reconstruction, and/or weight-based dosing when appropriate to reduce radiation dose to as low as reasonably achievable. FINDINGS: There are small bilateral pleural effusions and atelectasis or infiltrate at both lung bases. Heart is within appropriate limits. The liver is grossly normal. The spleen is normal. There is increased density in the region of the left adrenal gland. The right adrenal gland and pancreas are grossly normal. The gallbladder is normal.  There is a left ureteric stent in place. There is increased density in the left perinephric fat and surrounding the tail of the pancreas and extending into the left paracolic gutter suggestive of inflammatory process. No abnormalities of the small bowel loops are noted. The IVC and aorta are of normal caliber. There is no adenopathy. There is a Amaya catheter in place and a small amount of free air within the bladder The urinary bladder is normal. There is a small amount of pelvic free fluid. The colon is grossly normal. There is no adenopathy. There is lumbar spondylosis. 1. Left ureteric stent in place. Increased density in the perinephric fat on the left side extending into the tail of the pancreas and left paracolic gutter suggestive of inflammatory process. 2. Amaya catheter in the bladder. Small amount of free air within the bladder. 3. Small amount of free fluid within the pelvis. 4. Lumbar scoliosis. 5. Small bilateral pleural effusions and atelectasis or infiltrate at both lung bases. **This report has been created using voice recognition software. It may contain minor errors which are inherent in voice recognition technology. ** Final report electronically signed by DR Jose Tam on 12/25/2020 11:34 AM    Ct Chest Wo Contrast    Result Date: 12/25/2020  PROCEDURE: CT CHEST WO CONTRAST CLINICAL INFORMATION: covid. COMPARISON: Chest x-ray obtained on the same day. TECHNIQUE: 5 mm noncontrast axial images were obtained through the chest. Sagittal and coronal reconstructions were obtained. All CT scans at this facility use dose modulation, iterative reconstruction, and/or weight-based dosing when appropriate to reduce radiation dose to as low as reasonably achievable. FINDINGS: There are postoperative changes involving the right and left chest walls and bilateral axillary dissection. There is a right breast implant in place. There is a Port-A-Cath overlying the left hemithorax The heart size is normal. The aorta is of normal caliber. There is no gross abnormality of the pulmonary artery. There is no mediastinal, axillary or hilar adenopathy. There are small bilateral pleural effusions and atelectasis or infiltrates at both lung bases. There is increased density in both lung fields There is mild thoracic spondylosis. 1. Postoperative changes involving the right and left chest walls and axillary regions. 2. Right breast implant in place. Left-sided Port-A-Cath. 3. Small bilateral pleural effusion and atelectasis or infiltrates at both lung bases. 4. Slight increased density in both lung fields. 5. Thoracic spondylosis. **This report has been created using voice recognition software. It may contain minor errors which are inherent in voice recognition technology. ** Final report electronically signed by DR Cesar Nelson on 12/25/2020 11:25 AM    Xr Chest Portable    Result Date: 12/25/2020  1 view chest x-ray. Comparison: None Findings: Portable AP view. No cardiomegaly. Small left pleural effusion Mild passive venous congestion. Minimal airspace disease both lower lobes. Left Mediport projected at the SVC. Surgical clips both axilla. Osseous structures intact. Impression: 1. Small left pleural effusion. 2.  Mild passive venous congestion and minimal airspace disease lower lobes This document has been electronically signed by: Robina Blue MD on 12/25/2020 06:02 AM       Impression:    Patient Active Problem List   Diagnosis    Septic shock ([address]       s/p CYSTOSCOPY URETERAL [facility] on 12/25/2020  By Dr. eNlson August:    + COVID on 12/1/2020    CRT 1.1 today  Still with weakness, poor appetite. PT/OT? WBC 15.4, on Rocephin. Urine culture shows klebsiella. +blood culture, gram + bacilli    Await Hem Onc consult. Afebrile, ok to remove rgaves catheter today. Will need outpatient treatment of kidney stone in several weeks. Recommend antibiotics for at least 14 days. Discussed with patient. Will continue to follow along.       SHAYLA Mauricio - CNP  9:51 AM 12/28/2020

## 2020-12-28 NOTE — PROCEDURES
A Bladder scan was performed at 1650 . The patient's last void was at  1630. The residual amount was measured to be 0 ML. Report of results was given to [facility].

## 2020-12-28 NOTE — CONSULTS
[facility]    Patient - Srego Thomas   MRN -  [id number]   [facility] # - [de-identified]   - 1945      Date of Admission -  2020  1:59 AM  Date of evaluation -  2020  Room - [place], [place] Primary Care Physician - Jamal Zuluaga MD     Inpatient consult to Hem/Onc  Consult performed by: Jack Santiago, SHAYLA - CNP  Consult ordered by: Santy Vides PA-C           Reason for Consult    Thrombocytopenia, Hx breast cancer (follows in [facility])  [address] Problems    Diagnosis Date Noted    Septic shock ([facility] [address] [A41.9, R65.21] 2020     HPI   Sergo Thomas is a 67 y.o. female admitted as a transfer from [facility] to [address] [place] [address] ICU. Per chart review, pt presented to [facility] on 2020 with complaints of abdominal pain, N/V, dysuria. Urine cloudy, CT Abd/Pelvis (+) multiple calculi, mild/moderate left hydronephrosis, moderate left perinephric edema. Mild colonic diverticulosis. Pt discharged , pt returned to [facility] with AMS, weakness, confusion, difficulty walking, N/V, decreased urine output. Pt febrile at 99.2, hypotensive 87/53. Pt given 2.5 L 0.9% NS, ephedrine 10 mg IVP, Levophed gtt started. COVID19 (+). Pt transferred to [address] [place] [address] ICU. Urology consulted, cystoscopy and left stent placement 2020. Pt transferred to  on 2020 after levophed gtt stopped. 2020:  Pt resting in bed,  at bedside. Pt reports continued weakness, fatigue. Pt states graves catheter recently removed. Pt denies pain, H/A, dizziness, cough/SOB, CP, abd pain, N/V/C/D, peripheral edema, peripheral neuropathy, s/s bleeding. No petechia.   Pallor normal.      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 Right 01/15/2019    MASTECTOMY Left 04/07/2020     Diet    DIET RENAL;  Allergies    Bacitracin and Bactrim [sulfamethoxazole-trimethoprim]  Social History     Social History     Socioeconomic History    Marital status:      Spouse name: Not on file    Number of children: Not on file    Years of education: Not on file    Highest education level: Not on file   Occupational History    Not on file   Social Needs    Financial resource strain: Not on file    Food insecurity     Worry: Not on file     Inability: Not on file    Transportation needs     Medical: Not on file     Non-medical: Not on file   Tobacco Use    Smoking status: Never Smoker   Substance and Sexual Activity    Alcohol use: Not on file    Drug use: Not on file    Sexual activity: Not on file   Lifestyle    Physical activity     Days per week: Not on file     Minutes per session: Not on file    Stress: Not on file   Relationships    Social connections     Talks on phone: Not on file     Gets together: Not on file     Attends [employer] service: Not on file     Active member of club or organization: Not on file     Attends meetings of clubs or organizations: Not on file     Relationship status: Not on file    Intimate partner violence     Fear of current or ex partner: Not on file     Emotionally abused: Not on file     Physically abused: Not on file     Forced sexual activity: Not on file   Other Topics Concern  Not on file   Social History Narrative    Not on file     Family History    No family history on file. ROS     Review of Systems   Pertinent review of systems noted in HPI, all other ROS negative. Vitals     height is 5' 5\" (1.651 m) and weight is 197 lb 6.4 oz (89.5 kg). Her oral temperature is 98.1 °F (36.7 °C). Her blood pressure is 146/79 (abnormal) and her pulse is 62. Her respiration is 18 and oxygen saturation is 95%. O2 Flow Rate (L/min): 2 L/min    Exam   Physical Exam   General appearance: No apparent distress, calm and cooperative. HEENT: Pupils equal, round, and reactive to light. Conjunctivae/corneas clear. Oral mucosa moist.  Neck: Supple, with full range of motion. Trachea midline. No appreciable lymphadenopathy. Respiratory:  Normal respiratory effort. Clear to auscultation all lung fields. Cardiovascular: RRR, normal S1/S2. Abdomen: Soft, slightly tender with palpation, non-distended with active bowel sounds x 4. Musculoskeletal: No clubbing, cyanosis or edema bilaterally. Skin: Skin color, texture, turgor normal.  No visible rashes or lesions. Neurologic:  Neurovascularly intact without any focal sensory/motor deficits.    Psychiatric: Alert and oriented x 4, thought content appropriate, normal insight  Capillary Refill: Brisk,< 3 seconds   Peripheral Pulses: +2 palpable, equal bilaterally        Labs   CBC  Recent Labs     12/26/20  0435 12/27/20  0450 12/28/20  0340   WBC 8.7 15.3* 15.4*   RBC 3.20* 3.04* 3.34*   HGB 10.4* 9.9* 10.9*   HCT 32.0* 29.9* 33.1*   .0* 98.4 99.1*   MCH 32.5 32.6 32.6   MCHC 32.5 33.1 32.9   PLT 15* 15* 20*   MPV 13.1* 13.0* 13.0*      BMP  Recent Labs     12/26/20  0435 12/26/20  0932 12/27/20  0450 12/27/20  1839 12/28/20  0340   *  --  142 142 144   K 4.0  --  3.9  --  4.0     --  111  --  114*   CO2 19*  --  22*  --  22*   BUN 59*  --  58*  --  41*   CREATININE 1.9*  --  1.3*  --  1.1   GLUCOSE 203*  --  106  --  84 MG  --  2.0  --   --   --    CALCIUM 8.1*  --  8.4*  --  8.4*     LFT  Recent Labs     12/25/20  1630 12/27/20  0450   AST  --  12   ALT  --  17   BILITOT  --  0.4   ALKPHOS  --  84   LIPASE 4.8*  --      INR  Recent Labs     12/26/20  0900 12/27/20  0450   INR 1.04 0.89     PTT  Recent Labs     12/26/20  0900 12/27/20  0450   APTT 28.0 23.3       Radiology        Ct Abdomen Pelvis Wo Contrast Additional Contrast? None    Result Date: 12/25/2020  PROCEDURE: CT ABDOMEN PELVIS WO CONTRAST CLINICAL INFORMATION: abdominal pain . Covid positive. History of breast carcinoma. Ureteral stent. COMPARISON: None. TECHNIQUE: Axial 5 mm CT images were obtained through the abdomen and pelvis. No contrast was given. Coronal and sagittal reconstructions were obtained. All CT scans at this facility use dose modulation, iterative reconstruction, and/or weight-based dosing when appropriate to reduce radiation dose to as low as reasonably achievable. FINDINGS: There are small bilateral pleural effusions and atelectasis or infiltrate at both lung bases. Heart is within appropriate limits. The liver is grossly normal. The spleen is normal. There is increased density in the region of the left adrenal gland. The right adrenal gland and pancreas are grossly normal. The gallbladder is normal.  There is a left ureteric stent in place. There is increased density in the left perinephric fat and surrounding the tail of the pancreas and extending into the left paracolic gutter suggestive of inflammatory process. No abnormalities of the small bowel loops are noted. The IVC and aorta are of normal caliber. There is no adenopathy. There is a Amaya catheter in place and a small amount of free air within the bladder The urinary bladder is normal. There is a small amount of pelvic free fluid. The colon is grossly normal. There is no adenopathy. There is lumbar spondylosis. 1. Left ureteric stent in place. Increased density in the perinephric fat on the left side extending into the tail of the pancreas and left paracolic gutter suggestive of inflammatory process. 2. Amaya catheter in the bladder. Small amount of free air within the bladder. 3. Small amount of free fluid within the pelvis. 4. Lumbar scoliosis. 5. Small bilateral pleural effusions and atelectasis or infiltrate at both lung bases. **This report has been created using voice recognition software. It may contain minor errors which are inherent in voice recognition technology. ** Final report electronically signed by DR Atiya Luciano on 12/25/2020 11:34 AM    Ct Chest Wo Contrast    Result Date: 12/25/2020  PROCEDURE: CT CHEST WO CONTRAST CLINICAL INFORMATION: covid. COMPARISON: Chest x-ray obtained on the same day. TECHNIQUE: 5 mm noncontrast axial images were obtained through the chest. Sagittal and coronal reconstructions were obtained. All CT scans at this facility use dose modulation, iterative reconstruction, and/or weight-based dosing when appropriate to reduce radiation dose to as low as reasonably achievable. FINDINGS: There are postoperative changes involving the right and left chest walls and bilateral axillary dissection. There is a right breast implant in place. There is a Port-A-Cath overlying the left hemithorax The heart size is normal. The aorta is of normal caliber. There is no gross abnormality of the pulmonary artery. There is no mediastinal, axillary or hilar adenopathy. There are small bilateral pleural effusions and atelectasis or infiltrates at both lung bases. There is increased density in both lung fields There is mild thoracic spondylosis. 1. Postoperative changes involving the right and left chest walls and axillary regions. 2. Right breast implant in place. Left-sided Port-A-Cath. 3. Small bilateral pleural effusion and atelectasis or infiltrates at both lung bases. 4. Slight increased density in both lung fields. 5. Thoracic spondylosis. **This report has been created using voice recognition software. It may contain minor errors which are inherent in voice recognition technology. ** Final report electronically signed by DR Yvette Goodwin on 12/25/2020 11:25 AM    Xr Chest Portable    Result Date: 12/25/2020  1 view chest x-ray. Comparison: None Findings: Portable AP view. No cardiomegaly. Small left pleural effusion Mild passive venous congestion. Minimal airspace disease both lower lobes. Left Mediport projected at the SVC. Surgical clips both axilla. Osseous structures intact. Impression: 1. Small left pleural effusion. 2.  Mild passive venous congestion and minimal airspace disease lower lobes This document has been electronically signed by: Carmina Sy MD on 12/25/2020 06:02 AM       Assessment/Recommendations    1. Hx breast cancer - Completed last chemotherapy 10/2020. Continuing radiation. Pt follows at [address] [place] [address], follow up with [facility] after hospital course. 2.  Thrombocytopenia - Platelet count today 20, improved from 15 yesterday. Most likely sepsis, secondary to recent chemotherapy and recent COVID19 infection. Transfuse for platelet count < 72H. Trend. Platelets 578 on 40/98/5181, 53 on 12/24/2020. [facility] confirms pt did not receive any heparin dosing during her stay. 3.  Anemia - H/H 10.9/33. 1. MCV 99.1. Likely related to sepsis, hematuria, recent chemotherapy. Trend. Transfuse for Hgb < 7. Outpt follow up with [facility] as she follows with them and is not established in our office for local care and support. Would be happy to see pt for local care/support if pt desires.

## 2020-12-28 NOTE — PROGRESS NOTES
Pharmacy Medication History Note      List of current medications patient is taking is complete. Source of information: patient    Changes made to medication list:  Medications removed (include reason, ex. therapy complete or physician discontinued):  Removed norco, no longer taking  Removed zofran, no longer taking    Medications added/doses adjusted: Added calcium/vitamin D, take 2 tablets daily  Added hydrocortisone cream, patient applies when on radiation    Other notes (ex. Recent course of antibiotics, Coumadin dosing): Allergies: Unsure if bacitracin or bactrim - caused her to \"go crazy\" after surgery and made heart race   Denies use of other OTC or herbal medications.     Electronically signed by Armando Larsen [id number] [facility] on 12/28/2020 at 11:41 AM

## 2020-12-28 NOTE — PROGRESS NOTES
Hospitalist Progress Note      Patient:  Bryanan Estrada    Unit/Bed:4A-12/012-A  YOB: 1945  MRN: [id number]   Acct: [de-identified]   PCP: Connie Ramirez MD  Date of Admission: 12/25/2020    Assessment/Plan:    1. Acute Pyelonephritis 2/2 left ureterolithiasis with hydronephrosis: Urine culture growing Klebsiella pneumonia. Zosyn initiated 12/25-12/27, s/p left ureteral stent placement with urology 12/25/20. Lactic trended 3.2-1.6, Proclacitonin trended down 94-45. 1. Reviewed culture results from [facility], pansensitive Klebsiella in urine and blood, cultures at our facility growing Klebsiella pneumoniae resistant only to Ampicillin. Abx deescalated to Rocephin, ID consulted for duration of IV antibiotic recommendations with bacteremia. 2. Graves cath removed 12/28/20, no urination per RN, bladder scan pending. Will need OP treatment of ureterolithiasis when infection clears. 2. Bacteremia: 2/2 above, reviewed [facility] records, Klebsiella pneumonia bacteremia, pansenstive, antibiotics as above. Repeat blood culture drawn 12/25/20 growing  Gram positive bacilli, plan as alana. 3. JOEL: Cr on arrival 3.1, likely 2/2 above. Cr trended down to 1.1, graves cath removed as above. 4. Hyponatremia: Resolved with IVF. 5. Severe Thrombocytopenia: Suspected acute 2/2 sepsis as above. INR/aPTT wnl, fibrinogen elevated, fibrin split products positive, no schistocytes, elevated D-dimer but suspected 2/2 sepsis with no CP/SOB/LE edema or pain. Platelet count  751 on 12/23/20. Hx of stage IIa breast Cx, finished chemo 10/15/20, currently only undergoing radiation. 1. Platelet count up to 20k, no signs of active bleeding, no blood in graves bag this AM.  2. . Hem/Onc consulted, recommendations pending. 6. Non-AGMA: Improving, monitor. 7. Macrocytic Anemia: Chronic, Hgb improving, now 10.9. No B12/folate deficiency as above. No signs of active bleeding. Suspected 2/2 recent chemo. 8. Hx of BL Ductal Breast Cx: Follows in [facility], stage IIa s/p radical mastectomy and chemotherapy, finished eight cycles of Xeloda in 10/2020, has undergone 25 radiation treatments. Heme/onc consulted as above. 9. Recent Covid-19: No SOB or cough. Out of isolation window. 10. Hypomagnesemia: Replaced/Resolved. 11. Obesity: Body mass index is 32.85 kg/m². 12. Generalized Weakness: from home with  without services, PT/OT consulted for eval for home care needs. Disposition: Pending ID recs for IV Abx duration recommendations, monitor bladder function s/p graves removal, awaiting hem/onc recs regarding severe thrombocytopenia. Goal d/c tomorrow if stable following therapy recommendations.      Chief Complaint: Septic Shock    Initial H and P:- Infusion Medications   Scheduled Medications    cefTRIAXone (ROCEPHIN) IV  2 g Intravenous Q24H    carvedilol  3.125 mg Oral BID     magnesium replacement protocol   Other RX Placeholder    phosphorus replacement protocol   Other RX Placeholder     PRN Meds: promethazine **OR** ondansetron, polyethylene glycol, acetaminophen **OR** acetaminophen      Intake/Output Summary (Last 24 hours) at 12/28/2020 1547  Last data filed at 12/28/2020 1336  Gross per 24 hour   Intake 2177.49 ml   Output 2450 ml   Net -272.51 ml       Diet:  DIET RENAL;    Exam:  BP (!) 146/79   Pulse 62   Temp 98.1 °F (36.7 °C) (Oral)   Resp 18   Ht 5' 5\" (1.651 m)   Wt 197 lb 6.4 oz (89.5 kg)   SpO2 95%   BMI 32.85 kg/m²   General appearance: No apparent distress, appears stated age and cooperative. HEENT: Pupils equal, round, and reactive to light. Conjunctivae/corneas clear. Neck: Supple, with full range of motion. No jugular venous distention. Trachea midline. Respiratory:  Normal respiratory effort. Clear to auscultation, bilaterally without Rales/Wheezes/Rhonchi. Cardiovascular: Regular rate and rhythm with normal S1/S2 without murmurs, rubs or gallops. Abdomen: Soft, non-tender, non-distended with normal bowel sounds. Musculoskeletal: passive and active ROM x 4 extremities. : Amaya cath in place, clear yellow urine. Skin: Skin color, texture, turgor normal.  No rashes or lesions. Neurologic:  Neurovascularly intact without any focal sensory/motor deficits.  Cranial nerves: II-XII intact, grossly non-focal.  Psychiatric: Alert and oriented, thought content appropriate, normal insight  Capillary Refill: Brisk,< 3 seconds   Peripheral Pulses: +2 palpable, equal bilaterally     Labs:   Recent Labs     12/26/20  0435 12/27/20  0450 12/28/20  0340   WBC 8.7 15.3* 15.4*   HGB 10.4* 9.9* 10.9*   HCT 32.0* 29.9* 33.1*   PLT 15* 15* 20*     Recent Labs     12/26/20  0435 12/27/20  0450 12/27/20  1839 12/28/20 0340 * 142 142 144   K 4.0 3.9  --  4.0    111  --  114*   CO2 19* 22*  --  22*   BUN 59* 58*  --  41*   CREATININE 1.9* 1.3*  --  1.1   CALCIUM 8.1* 8.4*  --  8.4*     Recent Labs     12/27/20  0450   AST 12   ALT 17   BILIDIR <0.2   BILITOT 0.4   ALKPHOS 84     Recent Labs     12/26/20  0900 12/27/20  0450   INR 1.04 0.89     No results for input(s): Demetri Jolly in the last 72 hours. Microbiology:    Blood culture #1:   Lab Results   Component Value Date    BC No growth-preliminary  12/25/2020       Blood culture #2:No results found for: Sofia Houston    Organism:  Lab Results   Component Value Date    ORG gram positive bacilli 12/25/2020       No results found for: LABGRAM    MRSA culture only:No results found for: [address] [place] [address]    Urine culture:   Lab Results   Component Value Date    LABURIN [place] count: 10,000-50,000 CFU/mL  12/25/2020       Respiratory culture: No results found for: CULTRESP    Aerobic and Anaerobic :  No results found for: LABAERO  No results found for: LABANAE    Urinalysis:      Lab Results   Component Value Date    NITRU NEGATIVE 12/26/2020    WBCUA 25-50 12/26/2020    BACTERIA NONE SEEN 12/26/2020    RBCUA 50-75 12/26/2020    BLOODU LARGE 12/26/2020    [address] 1.009 12/26/2020       Radiology:  CT ABDOMEN PELVIS WO CONTRAST Additional Contrast? None   Final Result      1. Left ureteric stent in place. Increased density in the perinephric fat on the left side extending into the tail of the pancreas and left paracolic gutter suggestive of inflammatory process. 2. Amaya catheter in the bladder. Small amount of free air within the bladder. 3. Small amount of free fluid within the pelvis. 4. Lumbar scoliosis. 5. Small bilateral pleural effusions and atelectasis or infiltrate at both lung bases. **This report has been created using voice recognition software. It may contain minor errors which are inherent in voice recognition technology. ** Final report electronically signed by DR Valerie De Anda on 12/25/2020 11:34 AM      CT CHEST WO CONTRAST   Final Result       1. Postoperative changes involving the right and left chest walls and axillary regions. 2. Right breast implant in place. Left-sided Port-A-Cath. 3. Small bilateral pleural effusion and atelectasis or infiltrates at both lung bases. 4. Slight increased density in both lung fields. 5. Thoracic spondylosis. **This report has been created using voice recognition software. It may contain minor errors which are inherent in voice recognition technology. **      Final report electronically signed by DR Valerie De Anda on 12/25/2020 11:25 AM      XR CHEST PORTABLE   Final Result   Impression:   1. Small left pleural effusion.    2.  Mild passive venous congestion and minimal airspace disease lower lobes      This document has been electronically signed by: Antwon Brothers MD on    12/25/2020 06:02 AM           Ct Abdomen Pelvis Wo Contrast Additional Contrast? None    Result Date: 12/25/2020 PROCEDURE: CT ABDOMEN PELVIS WO CONTRAST CLINICAL INFORMATION: abdominal pain . Covid positive. History of breast carcinoma. Ureteral stent. COMPARISON: None. TECHNIQUE: Axial 5 mm CT images were obtained through the abdomen and pelvis. No contrast was given. Coronal and sagittal reconstructions were obtained. All CT scans at this facility use dose modulation, iterative reconstruction, and/or weight-based dosing when appropriate to reduce radiation dose to as low as reasonably achievable. FINDINGS: There are small bilateral pleural effusions and atelectasis or infiltrate at both lung bases. Heart is within appropriate limits. The liver is grossly normal. The spleen is normal. There is increased density in the region of the left adrenal gland. The right adrenal gland and pancreas are grossly normal. The gallbladder is normal.  There is a left ureteric stent in place. There is increased density in the left perinephric fat and surrounding the tail of the pancreas and extending into the left paracolic gutter suggestive of inflammatory process. No abnormalities of the small bowel loops are noted. The IVC and aorta are of normal caliber. There is no adenopathy. There is a Amaya catheter in place and a small amount of free air within the bladder The urinary bladder is normal. There is a small amount of pelvic free fluid. The colon is grossly normal. There is no adenopathy. There is lumbar spondylosis. 1. Left ureteric stent in place. Increased density in the perinephric fat on the left side extending into the tail of the pancreas and left paracolic gutter suggestive of inflammatory process. 2. Amaya catheter in the bladder. Small amount of free air within the bladder. 3. Small amount of free fluid within the pelvis. 4. Lumbar scoliosis. 5. Small bilateral pleural effusions and atelectasis or infiltrate at both lung bases. **This report has been created using voice recognition software. It may contain minor errors which are inherent in voice recognition technology. ** Final report electronically signed by DR Marlon Alonso on 12/25/2020 11:34 AM    Ct Chest Wo Contrast    Result Date: 12/25/2020  PROCEDURE: CT CHEST WO CONTRAST CLINICAL INFORMATION: covid. COMPARISON: Chest x-ray obtained on the same day. TECHNIQUE: 5 mm noncontrast axial images were obtained through the chest. Sagittal and coronal reconstructions were obtained. All CT scans at this facility use dose modulation, iterative reconstruction, and/or weight-based dosing when appropriate to reduce radiation dose to as low as reasonably achievable. FINDINGS: There are postoperative changes involving the right and left chest walls and bilateral axillary dissection. There is a right breast implant in place. There is a Port-A-Cath overlying the left hemithorax The heart size is normal. The aorta is of normal caliber. There is no gross abnormality of the pulmonary artery. There is no mediastinal, axillary or hilar adenopathy. There are small bilateral pleural effusions and atelectasis or infiltrates at both lung bases. There is increased density in both lung fields There is mild thoracic spondylosis. 1. Postoperative changes involving the right and left chest walls and axillary regions. 2. Right breast implant in place. Left-sided Port-A-Cath. 3. Small bilateral pleural effusion and atelectasis or infiltrates at both lung bases. 4. Slight increased density in both lung fields. 5. Thoracic spondylosis. **This report has been created using voice recognition software. It may contain minor errors which are inherent in voice recognition technology. ** Final report electronically signed by DR Tramaine Bella on 12/25/2020 11:25 AM    Xr Chest Portable    Result Date: 12/25/2020  1 view chest x-ray. Comparison: None Findings: Portable AP view. No cardiomegaly. Small left pleural effusion Mild passive venous congestion. Minimal airspace disease both lower lobes. Left Mediport projected at the SVC. Surgical clips both axilla. Osseous structures intact. Impression: 1. Small left pleural effusion.  2.  Mild passive venous congestion and minimal airspace disease lower lobes This document has been electronically signed by: Dennis Moraes MD on 12/25/2020 06:02 AM       Electronically signed by Ren De La Paz PA-C on 12/28/2020 at 3:47 PM

## 2020-12-29 ENCOUNTER — TELEPHONE (OUTPATIENT)
Dept: UROLOGY | Age: 75
End: 2020-12-29

## 2020-12-29 LAB
ANION GAP SERPL CALCULATED.3IONS-SCNC: 8 MEQ/L (ref 8–16)
BUN BLDV-MCNC: 29 MG/DL (ref 7–22)
CALCIUM SERPL-MCNC: 8.4 MG/DL (ref 8.5–10.5)
CHLORIDE BLD-SCNC: 106 MEQ/L (ref 98–111)
CO2: 23 MEQ/L (ref 23–33)
CREAT SERPL-MCNC: 0.9 MG/DL (ref 0.4–1.2)
ERYTHROCYTE [DISTWIDTH] IN BLOOD BY AUTOMATED COUNT: 12.7 % (ref 11.5–14.5)
ERYTHROCYTE [DISTWIDTH] IN BLOOD BY AUTOMATED COUNT: 46 FL (ref 35–45)
GFR SERPL CREATININE-BSD FRML MDRD: 61 ML/MIN/1.73M2
GLUCOSE BLD-MCNC: 87 MG/DL (ref 70–108)
HCT VFR BLD CALC: 32.8 % (ref 37–47)
HEMOGLOBIN: 10.8 GM/DL (ref 12–16)
MCH RBC QN AUTO: 32.6 PG (ref 26–33)
MCHC RBC AUTO-ENTMCNC: 32.9 GM/DL (ref 32.2–35.5)
MCV RBC AUTO: 99.1 FL (ref 81–99)
PLATELET # BLD: 36 THOU/MM3 (ref 130–400)
PLATELET # BLD: 54 THOU/MM3 (ref 130–400)
PMV BLD AUTO: 11.8 FL (ref 9.4–12.4)
POTASSIUM SERPL-SCNC: 3.7 MEQ/L (ref 3.5–5.2)
RBC # BLD: 3.31 MILL/MM3 (ref 4.2–5.4)
SODIUM BLD-SCNC: 137 MEQ/L (ref 135–145)
WBC # BLD: 11.3 THOU/MM3 (ref 4.8–10.8)

## 2020-12-29 PROCEDURE — 2580000003 HC RX 258: Performed by: PHYSICIAN ASSISTANT

## 2020-12-29 PROCEDURE — 85027 COMPLETE CBC AUTOMATED: CPT

## 2020-12-29 PROCEDURE — 2060000000 HC ICU INTERMEDIATE R&B

## 2020-12-29 PROCEDURE — 97535 SELF CARE MNGMENT TRAINING: CPT

## 2020-12-29 PROCEDURE — 97166 OT EVAL MOD COMPLEX 45 MIN: CPT

## 2020-12-29 PROCEDURE — 99232 SBSQ HOSP IP/OBS MODERATE 35: CPT | Performed by: UROLOGY

## 2020-12-29 PROCEDURE — 97163 PT EVAL HIGH COMPLEX 45 MIN: CPT

## 2020-12-29 PROCEDURE — 94760 N-INVAS EAR/PLS OXIMETRY 1: CPT

## 2020-12-29 PROCEDURE — 85049 AUTOMATED PLATELET COUNT: CPT

## 2020-12-29 PROCEDURE — 80048 BASIC METABOLIC PNL TOTAL CA: CPT

## 2020-12-29 PROCEDURE — 6360000002 HC RX W HCPCS: Performed by: PHYSICIAN ASSISTANT

## 2020-12-29 PROCEDURE — 6370000000 HC RX 637 (ALT 250 FOR IP): Performed by: PHYSICIAN ASSISTANT

## 2020-12-29 PROCEDURE — 97116 GAIT TRAINING THERAPY: CPT

## 2020-12-29 PROCEDURE — 99232 SBSQ HOSP IP/OBS MODERATE 35: CPT | Performed by: PHYSICIAN ASSISTANT

## 2020-12-29 RX ADMIN — CARVEDILOL 3.12 MG: 3.12 TABLET, FILM COATED ORAL at 16:58

## 2020-12-29 RX ADMIN — CEFTRIAXONE SODIUM 2 G: 2 INJECTION, POWDER, FOR SOLUTION INTRAMUSCULAR; INTRAVENOUS at 18:03

## 2020-12-29 RX ADMIN — CARVEDILOL 3.12 MG: 3.12 TABLET, FILM COATED ORAL at 09:09

## 2020-12-29 ASSESSMENT — PAIN SCALES - GENERAL
PAINLEVEL_OUTOF10: 0

## 2020-12-29 NOTE — TELEPHONE ENCOUNTER
She will need scheduled for Cystoscopy, right ureteroscopy, laser lithotripsy, basket retrieval of stone fragments, and right ureteral stent placement per Dr Mitchel Guerrero in 3-4 weeks.   She will be discharged on 14 days of antibiotics  She will need UA prior

## 2020-12-29 NOTE — TELEPHONE ENCOUNTER
She will need scheduled for right stone treatment in 3-4 weeks with Dr Merritt Pace, she will be discharged home on 14 days of antibiotics.   Will need a urine prior to surgery

## 2020-12-29 NOTE — FLOWSHEET NOTE
12/29/20 8240   Provider Notification   Reason for Communication Evaluate   Provider Name Dr. Maryuri Nelson   Provider Notification Physician   Method of Communication Secure Message   Response Waiting for response   Notification Time 0609     ID consult completed to Dr. Maryuri Nelson.

## 2020-12-29 NOTE — PROGRESS NOTES
[address] [address]  INPATIENT PHYSICAL THERAPY  EVALUATION  [facility]    Time In: 2430  Time Out: 3135  Timed Code Treatment Minutes: 8 Minutes  Minutes: 17          Date: 2020  Patient Name: Titi Patton,  Gender:  female        MRN: [id number]  : 1945  (67 y.o.)      Referring Practitioner: SUELLEN Persaud  Diagnosis: severe sepsis with septic shock  Additional Pertinent Hx: Nilson Hannah is a 40-year-old white female who presented to [facility] on 2020 as a transfer from [address] [place] [address] for complaints of abdominal pain nausea and vomiting. She has a past medical history of lifetime non-smoker, stage II breast cancer, radical mastectomy on 2020 with chemo. Currently receiving radiation. Does have history of COVID-19 infection 2020. Per report patient presented to [facility] with complaints abdominal pain nausea and vomiting on 2020. She was having some dysuria. Patient was given Toradol, Norco, Flomax, Zofran. Urine was noted to be cloudy CT abdomen pelvis was completed without contrast that showed multiple calculi in the distal left ureter and within the left UV junction each measuring about 2 mm. Mild to moderate hydronephrosis was noted. She was discharged home. On 2020 she presented back to the [address] [place] [address] system emergency department with altered mental status and weakness. Family identified at that time that she was having confusion. Daughter stated that she was have difficulty walking and was saying inappropriate things. He had been prescribed Norco on previous visit but denied that she had taken any. She was transferred to [facility] and underwent stent placement with urology. She was no levophed, which has been stopped. She will transfer to . \"     Restrictions/Precautions:  Restrictions/Precautions: General Precautions, Fall Risk    Subjective:  Chart Reviewed:  Yes Patient assessed for rehabilitation services?: Yes  Subjective: pleasant and cooperative, per pt feels more fatigue than normal    General:       Vision: Impaired  Vision Exceptions: Wears glasses at all times              Pain: no pain per pt    Social/Functional History:    Lives With: Spouse  Type of Home: House  Home Layout: Two level, Performs ADL's on one level, Able to Live on Main level with bedroom/bathroom(do not usually use the second level)  Home Access: Stairs to enter without rails  Entrance Stairs - Number of Steps: 1     Bathroom Shower/Tub: Tub/Shower unit  Bathroom Toilet: Handicap height  Bathroom Equipment: Grab bars in shower       ADL Assistance: [address]: Independent  Homemaking Responsibilities: Yes  Ambulation Assistance: Independent  Transfer Assistance: Independent    Active : Yes  Occupation: Retired  Additional Comments: no AD used PTA    OBJECTIVE:  Range of Motion:  Bilateral Lower Extremity: WFL    Strength:  Bilateral Lower Extremity: WFL, generalized weakness    Balance:  Static Sitting Balance:  Supervision  Static Standing Balance: Stand By Assistance, with RW    Bed Mobility:  Rolling to Left: Modified Independent   Supine to Sit: Stand By Assistance  Scooting: Stand By Assistance    Transfers:  Sit to Stand: Stand By Assistance  Stand to Sit:Stand By Assistance  Cues for hand placement for safety  Ambulation:  Stand By Assistance  Distance: 5'x1, 350'x1  Surface: Level Tile  Device:Rolling Walker  Gait Deviations:  Slow Lindsay and min unsteady, no LOB    Exercise:  Patient was guided in 1 set(s) 10 reps of exercise to both lower extremities. reviewed seated BLE ankle pumps, glut set, long arc quad, marches, hip ABD/ADD to cont throughout the day. Exercises were completed for increased independence with functional mobility.     Functional Outcome Measures: Completed  AM-PAC Inpatient Mobility Raw Score : 19 AM-PAC Inpatient T-Scale Score : 45.44    ASSESSMENT:  Activity Tolerance:  Patient tolerance of  treatment: good. Treatment Initiated: Treatment and education initiated within context of evaluation. Evaluation time included review of current medical information, gathering information related to past medical, social and functional history, completion of standardized testing, formal and informal observation of tasks, assessment of data and development of plan of care and goals. Treatment time included skilled education and facilitation of tasks to increase safety and independence with functional mobility for improved independence and quality of life. Assessment: Body structures, Functions, Activity limitations: Decreased functional mobility , Decreased balance, Decreased endurance, Decreased strength  Assessment: pt with deconditioning, generalized weakness, dec balance, inc assist wiht use of walker for safe mobility, recommend cont PT to inc pt I with functional mobility  Prognosis: Excellent    REQUIRES PT FOLLOW UP: Yes    Discharge Recommendations:  Discharge Recommendations: Continue to assess pending progress    Patient Education:  PT Education: Goals, PT Role, Plan of Care, Functional Mobility Training    Equipment Recommendations:   Other: monitor for needs, pt may benefit from RW at discharge    Plan:  Times per week: 5X GM  Times per day: Daily  Specific instructions for Next Treatment: therex, mobility, balance activities, monitor for need for RW at discharge    Goals:  Patient goals : return home  Short term goals  Time Frame for Short term goals: by discharge  Short term goal 1: bed mobility with I to get in/out of bed  Short term goal 2: transfer with MOD I to get in/out of chairs  Short term goal 3: amb >150'x1 with LRAD or no AD and MOD I to walk safely in home  Short term goal 4: negotiate 1 step without HR and S to enter home safely  Long term goals

## 2020-12-29 NOTE — PROGRESS NOTES
Bird Thakkar 59  INPATIENT OCCUPATIONAL THERAPY  [facility] NEUROSCIENCES   EVALUATION    Time In:   Time Out: 0845  Timed Code Treatment Minutes: 15 Minutes  Minutes: 31          Date: 2020  Patient Name: Mayra Fernandes,   Gender: female      MRN: [id number]  : 1945  (67 y.o.)  Referring Practitioner: Facundo Petersen PA-C  Diagnosis: severe sepsis with septic shock  Additional Pertinent Hx: Timmy Peralta is a 69-year-old white female who presented to [facility] on 2020 as a transfer from [address] [place] [address] for complaints of abdominal pain nausea and vomiting. She has a past medical history of lifetime non-smoker, stage II breast cancer, radical mastectomy on 2020 with chemo. Currently receiving radiation. Does have history of COVID-19 infection 2020. Per report patient presented to [facility] with complaints abdominal pain nausea and vomiting on 2020. She was having some dysuria. Patient was given Toradol, Norco, Flomax, Zofran. Urine was noted to be cloudy CT abdomen pelvis was completed without contrast that showed multiple calculi in the distal left ureter and within the left UV junction each measuring about 2 mm. Mild to moderate hydronephrosis was noted. She was discharged home. On 2020 she presented back to the [address] [place] [address] system emergency department with altered mental status and weakness. Family identified at that time that she was having confusion. Daughter stated that she was have difficulty walking and was saying inappropriate things. He had been prescribed Norco on previous visit but denied that she had taken any. She was transferred to [facility] and underwent stent placement with urology. She was no levophed, which has been stopped. She will transfer to .  \"    Restrictions/Precautions:  Restrictions/Precautions: General Precautions, Fall Risk    Subjective  Chart Reviewed: Yes, Orders, Progress Notes Patient assessed for rehabilitation services?: Yes  Family / Caregiver Present: No    Subjective: RN okayed session. Pt was supine in bed upon arrival. Pt cooperative and agreeable to OT.     Pain:  Pain Assessment  Patient Currently in Pain: Denies    Social/Functional History:  Lives With: Spouse  Type of Home: House  Home Layout: Two level, Performs ADL's on one level, Able to Live on Main level with bedroom/bathroom(do not usually use the second level)  Home Access: Stairs to enter without rails  Entrance Stairs - Number of Steps: 1   Bathroom Shower/Tub: Tub/Shower unit  Bathroom Toilet: Handicap height  Bathroom Equipment: Grab bars in shower       ADL Assistance: Independent  Homemaking Assistance: Independent  Homemaking Responsibilities: Yes  Ambulation Assistance: Independent  Transfer Assistance: Independent    Active : Yes  Occupation: Retired       Cognition/Orientation:  Overall Orientation Status: Within Functional Limits  Overall Cognitive Status: WFL    ADL's:  Grooming: Contact guard assistance(for oral care and face care while standing at sink)       Functional Mobility:  Bed mobility  Supine to Sit: Stand by assistance  Scooting: Stand by assistance    Functional Mobility  Functional - Mobility Device: Rolling Walker  Activity: To/from bathroom  Assist Level: Contact guard assistance  Functional Mobility Comments: Min instability, no LOB, slow pace, min SOB     Balance:  Balance  Sitting Balance: Supervision  Standing Balance: Contact guard assistance  Standing Balance  Time: 5 min  Activity: oral care and face care  Comment: Minimal SOB noted, no LOB or decreased safety    Transfers:  Sit to stand: Contact guard assistance  Stand to sit: Contact guard assistance  Transfer Comments: min VC to push off of bed with hands       Upper Extremity Assessment:   LUE General AROM: aprrox 140, slightly restricted d/t hx of mastectomy sx RUE General AROM: approx 130,  slightly restricted d/t hx of mastectomy sx    LUE Strength  Gross LUE Strength: [facility]  LUE Strength Comment: gross deconditioning  RUE Strength  Gross RUE Strength: Exceptions to [facility]  RUE Strength Comment: gross deconditioning    Sensation  Overall Sensation Status: WFL  RUE Tone: Normotonic  LUE Tone: Normotonic       Activity Tolerance: Patient Tolerated treatment well       Assessment:  Assessment: Pt presented with the listed deficits on this date. Pt with decreased strength and endurance leading to decreased activity tolerance associated with ADLs and IADLs. Pt would benefit from continued OT to further increase strength, endurance, and indep with ADLs and IADLs. Performance deficits / Impairments: Decreased functional mobility , Decreased safe awareness, Decreased balance, Decreased posture, Decreased endurance, Decreased high-level IADLs, Decreased strength  Prognosis: Good  REQUIRES OT FOLLOW UP: Yes  Decision Making: Medium Complexity  Safety Devices in place: Yes    Treatment Initiated: Treatment and education initiated within context of evaluation. Evaluation time included review of current medical information, gathering information related to past medical, social and functional history, completion of standardized testing, formal and informal observation of tasks, assessment of data and development of plan of care and goals. Treatment time included skilled education and facilitation of tasks to increase safety and independence with ADL's for improved functional independence and quality of life.     Discharge Recommendations:  Continue to assess pending progress, Home with assist PRN, Home with Home health OT    Patient Education:  OT Education: OT Role, Plan of Care, Home Exercise Program, ADL Adaptive Strategies    Equipment Recommendations:  Equipment Needed: Yes(Possible RW)    Plan:  Times per week: 5x  Times per day: Daily Current Treatment Recommendations: Strengthening, Safety Education & Training, Balance Training, Endurance Training    Goals:  Patient goals : go home  Short term goals  Time Frame for Short term goals: by d/c  Short term goal 1: Pt will complete functional mobility HH distances with LRAE and SBA with no LOB to increase indep with ADL routine  Short term goal 2: Pt will complete various t/fs with SBA and no VC for safety to increase indep with toileting  Short term goal 3: Pt will tolerate dynamic standing x7 min with SBA and min SOB to increase indep with grooming  Short term goal 4: Pt will complete LB ADL with AE prn to increase indep with dressing  Long term goals  Time Frame for Long term goals : no LTG d/t short ELOS  See long-term goal time frame for expected duration of plan of care. If no long-term goals established, a short length of stay is anticipated. Following session, patient left in safe position with all fall risk precautions in place.

## 2020-12-29 NOTE — PROGRESS NOTES
Rolando Staples, APRN - CNP  Urology Progress Note    Subjective: Nahum Raza is a 67 y.o. female. s/p CYSTOSCOPY URETERAL STENT INSERTION on 12/25/2020  For multiple left distal ureteral stones with left hydronephrosis by Dr. Yordan Carvajal      His/Her current Diet is: DIET RENAL;.    Since the previous note, the patient reports the following:  No acute issues overnight. No fevers or chills. No nausea or vomiting. No chest pain or shortness of breath. No calf pain. Pain Controlled. Tolerating stent well. Urinating spontaneously  Ambulating. Shivani Roberts has improved  Tolerating PO Diet. Appetite has improved      Vitals and Labs:  Patient Vitals for the past 24 hrs:   BP Temp Temp src Pulse Resp SpO2   12/29/20 1131 (!) 147/72 98.5 °F (36.9 °C) Oral 68 19 96 %   12/29/20 1000      96 %   12/29/20 0857 (!) 159/75 98.3 °F (36.8 °C) Oral 65 18 94 %   12/29/20 0333 127/69 98.4 °F (36.9 °C) Oral 57 18 96 %   12/29/20 0011 131/65 98.5 °F (36.9 °C) Oral 61 18 93 %   12/28/20 2000 139/77 98.4 °F (36.9 °C) Oral 65 16 92 %   12/28/20 1545 (!) 144/83 98 °F (36.7 °C) Oral 64 18 94 %     I/O last 3 completed shifts: In: 1136.5 [P.O.:890; I.V.:246.5]  Out: 1350 [Urine:1350]    Recent Labs     12/27/20  0450 12/28/20  0340 12/29/20  0540   WBC 15.3* 15.4* 11.3*   HGB 9.9* 10.9* 10.8*   HCT 29.9* 33.1* 32.8*   MCV 98.4 99.1* 99.1*   PLT 15* 20* 36*     Recent Labs     12/27/20  0450 12/27/20  1839 12/28/20  0340 12/29/20  0540    142 144 137   K 3.9  --  4.0 3.7     --  114* 106   CO2 22*  --  22* 23   BUN 58*  --  41* 29*   CREATININE 1.3*  --  1.1 0.9       Recent Labs     12/26/20 1930   COLORU YELLOW   PHUR 5.5   LABCAST NONE SEEN  NONE SEEN   WBCUA 25-50   RBCUA 50-75   YEAST NONE SEEN   BACTERIA NONE SEEN   SPECGRAV 1.009   LEUKOCYTESUR LARGE*   UROBILINOGEN 0.2   BILIRUBINUR NEGATIVE   BLOODU LARGE*       Physical Exam:  No acute distress. Awake, alert and oriented.   Neck is supple Regular rate and rhythm. Normal peripheral pulses  No accessory muscles of inspiration. Symmetric chest rise  Abdomen soft, non-tender, non-distended. No CVA tenderness. No calf pain. Minimal/no edema in bilateral lower extremities.   Skin is warm, dry  Psych: mood, affect normal    Additional Lab/Culture results:     Imaging Reviewed:     SHAYLA Chavez CNP independently reviewed the images and verified the radiology reports from:    Ct Abdomen Pelvis Wo Contrast Additional Contrast? None    Result Date: 12/25/2020 PROCEDURE: CT ABDOMEN PELVIS WO CONTRAST CLINICAL INFORMATION: abdominal pain . Covid positive. History of breast carcinoma. Ureteral stent. COMPARISON: None. TECHNIQUE: Axial 5 mm CT images were obtained through the abdomen and pelvis. No contrast was given. Coronal and sagittal reconstructions were obtained. All CT scans at this facility use dose modulation, iterative reconstruction, and/or weight-based dosing when appropriate to reduce radiation dose to as low as reasonably achievable. FINDINGS: There are small bilateral pleural effusions and atelectasis or infiltrate at both lung bases. Heart is within appropriate limits. The liver is grossly normal. The spleen is normal. There is increased density in the region of the left adrenal gland. The right adrenal gland and pancreas are grossly normal. The gallbladder is normal.  There is a left ureteric stent in place. There is increased density in the left perinephric fat and surrounding the tail of the pancreas and extending into the left paracolic gutter suggestive of inflammatory process. No abnormalities of the small bowel loops are noted. The IVC and aorta are of normal caliber. There is no adenopathy. There is a Amaya catheter in place and a small amount of free air within the bladder The urinary bladder is normal. There is a small amount of pelvic free fluid. The colon is grossly normal. There is no adenopathy. There is lumbar spondylosis. 1. Left ureteric stent in place. Increased density in the perinephric fat on the left side extending into the tail of the pancreas and left paracolic gutter suggestive of inflammatory process. 2. Amaya catheter in the bladder. Small amount of free air within the bladder. 3. Small amount of free fluid within the pelvis. 4. Lumbar scoliosis. 5. Small bilateral pleural effusions and atelectasis or infiltrate at both lung bases. **This report has been created using voice recognition software. It may contain minor errors which are inherent in voice recognition technology. ** Final report electronically signed by DR Mayte Walton on 12/25/2020 11:34 AM    Ct Chest Wo Contrast    Result Date: 12/25/2020  PROCEDURE: CT CHEST WO CONTRAST CLINICAL INFORMATION: covid. COMPARISON: Chest x-ray obtained on the same day. TECHNIQUE: 5 mm noncontrast axial images were obtained through the chest. Sagittal and coronal reconstructions were obtained. All CT scans at this facility use dose modulation, iterative reconstruction, and/or weight-based dosing when appropriate to reduce radiation dose to as low as reasonably achievable. FINDINGS: There are postoperative changes involving the right and left chest walls and bilateral axillary dissection. There is a right breast implant in place. There is a Port-A-Cath overlying the left hemithorax The heart size is normal. The aorta is of normal caliber. There is no gross abnormality of the pulmonary artery. There is no mediastinal, axillary or hilar adenopathy. There are small bilateral pleural effusions and atelectasis or infiltrates at both lung bases. There is increased density in both lung fields There is mild thoracic spondylosis.

## 2020-12-29 NOTE — CARE COORDINATION
DISASTER CHARTING    12/29/20, 2:01 PM EST    DISCHARGE ONGOING EVALUATION:     Ennis Diener day: 4  Location: [facility]12/012-A Reason for admit: Severe sepsis with septic shock [R65.21]   Barriers to Discharge: PT/OT, Urology following, ID consult, IV antibiotics, electrolyte replacement protocols, Hem/Onc following. PCP: Rene Luong MD  Patient Goals/Plan/Treatment Preferences: Plan is to return ome with spouse. Will follow for potential needs.

## 2020-12-29 NOTE — PROGRESS NOTES
Hospitalist Progress Note      Patient:  Antwon Louise    Unit/Bed:4A-12/012-A  YOB: 1945  MRN: [id number]   Acct: [de-identified]   PCP: Tasneem Kendall MD  Date of Admission: 12/25/2020    Assessment/Plan:    1. Acute Pyelonephritis 2/2 left ureterolithiasis with hydronephrosis: Urine culture growing Klebsiella pneumonia. Zosyn initiated 12/25-12/27, s/p left ureteral stent placement with urology 12/25/20. Lactic trended 3.2-1.6, Proclacitonin trended down 94-45. 1. Reviewed culture results from [facility], pansensitive Klebsiella in urine and blood, cultures at our facility growing Klebsiella pneumoniae resistant only to Ampicillin. Abx deescalated to Rocephin. Graves removed 12/28/20, urinating without difficulty. 2. WBC trending down to 11.3. ID consulted for duration of IV antibiotic recommendations with bacteremia. 3. Will need OP treatment of stones with urology. 2. Bacteremia: 2/2 above, reviewed [facility] records, Klebsiella pneumonia bacteremia, pansenstive, antibiotics as above. Repeat blood culture drawn 12/25/20 growing  Gram negative bacilli, plan as alana. 3. JOEL: Cr on arrival 3.1, likely 2/2 above. Cr trended down to 0.9, graves cath removed as above. 4. Hyponatremia: Resolved with IVF, since discontinued. 5. Severe Thrombocytopenia: Suspected acute 2/2 sepsis as above. INR/aPTT wnl, fibrinogen elevated, fibrin split products positive, no schistocytes, elevated D-dimer but suspected 2/2 sepsis with no CP/SOB/LE edema or pain. Platelet count  688 on 12/23/20. Hx of stage IIa breast Cx, finished chemo 10/15/20, currently only undergoing radiation. Trended down to 15k  1. Plt trending up to 36 today. Hem/onc consulted. I discussed case with Lee Osborne CNP. Recommendations to monitor overnight with repeat platelet count tonight and again in AM. F/u OP with established oncologist at [facility]. 6. Non-AGMA: Resolved. 7. Macrocytic Anemia: Chronic, Hgb stable, now 10.8. No B12/folate deficiency as above. No signs of active bleeding. Suspected 2/2 recent chemo. 8. Hx of BL Ductal Breast Cx: Follows in [facility], stage IIa s/p radical mastectomy and chemotherapy, finished eight cycles of Xeloda in 10/2020, has undergone 25 radiation treatments. Heme/onc consulted as above. 9. Recent Covid-19: No SOB or cough. Out of isolation window. 10. Hypomagnesemia: Replaced/Resolved. 11. Obesity: Body mass index is 32.85 kg/m². 12. Generalized Weakness: from home with  without services, PT/OT evals recs for home with assist prn vs home health. Disposition: trend platelets overnight per hem/onc, plan d/c home in AM. Discussed with hem/onc and urology.      Chief Complaint: Septic Shock    Initial H and P:- \"Jeimy Rausch is a 19-year-old white female who presented to [facility] on 12/25/2020 as a transfer from [address] [place] [address] for complaints of abdominal pain nausea and vomiting. Krissy Suresh has a past medical history of lifetime non-smoker, stage II breast cancer, radical mastectomy on 4/7/2020 with chemo.  Currently receiving radiation.  Does have history of COVID-19 infection November 2020.  Per report patient presented to [address] [place] [address] system with complaints abdominal pain nausea and vomiting on 12/23/2020. Krissy Suresh was having some dysuria.  Patient was given Toradol, Norco, Flomax, Zofran.  Urine was noted to be cloudy CT abdomen pelvis was completed without contrast that showed multiple calculi in the distal left ureter and within the left UV junction each measuring about 2 mm.  Mild to moderate hydronephrosis was noted. Krissy Suresh was discharged home.  On 12/24/2020 she presented back to the [address] [place] [address] emergency department with altered mental status and weakness.  Family identified at that time that she was having confusion.  Daughter stated that she was have difficulty walking and was saying inappropriate things.  He had been prescribed Norco on previous visit but denied that she had taken any. She was transferred to [facility] and underwent stent placement with urology. Krissy Suresh was no levophed, which has been stopped. Krissy Suresh will transfer to . \"    Subjective (past 24 hours):   Resting comfortably in bed, denies any abdominal pain, nausea, vomiting, constipation. Urinating without difficulty. No hematuria. No hematochezia or melena. Past medical history, family history, social history and allergies reviewed again and is unchanged since admission. ROS (12 point review of systems completed. Pertinent positives noted.  Otherwise ROS is negative)     Medications:  Reviewed    Infusion Medications   Scheduled Medications    cefTRIAXone (ROCEPHIN) IV  2 g Intravenous Q24H  carvedilol  3.125 mg Oral BID     magnesium replacement protocol   Other RX Placeholder    phosphorus replacement protocol   Other RX Placeholder     PRN Meds: promethazine **OR** ondansetron, polyethylene glycol, acetaminophen **OR** acetaminophen      Intake/Output Summary (Last 24 hours) at 12/29/2020 1758  Last data filed at 12/29/2020 1609  Gross per 24 hour   Intake 989 ml   Output 1650 ml   Net -661 ml       Diet:  DIET RENAL;    Exam:  BP (!) 159/81   Pulse 70   Temp 99.2 °F (37.3 °C)   Resp 20   Ht 5' 5\" (1.651 m)   Wt 197 lb 6.4 oz (89.5 kg)   SpO2 96%   BMI 32.85 kg/m²   General appearance: No apparent distress, appears stated age and cooperative. HEENT: Pupils equal, round, and reactive to light. Conjunctivae/corneas clear. Neck: Supple, with full range of motion. No jugular venous distention. Trachea midline. Respiratory:  Normal respiratory effort. Clear to auscultation, bilaterally without Rales/Wheezes/Rhonchi. Chemo port left anterior chest wall. Cardiovascular: Regular rate and rhythm with normal S1/S2 without murmurs, rubs or gallops. Abdomen: Soft, non-tender, non-distended with normal bowel sounds. Musculoskeletal: passive and active ROM x 4 extremities. : Amaya cath in place, clear yellow urine.   Skin: Skin color, texture, turgor normal.  No rashes or lesions. Neurologic:  Neurovascularly intact without any focal sensory/motor deficits.  Cranial nerves: II-XII intact, grossly non-focal.  Psychiatric: Alert and oriented, thought content appropriate, normal insight  Capillary Refill: Brisk,< 3 seconds   Peripheral Pulses: +2 palpable, equal bilaterally     Labs:   Recent Labs     12/27/20  0450 12/28/20  0340 12/29/20  0540   WBC 15.3* 15.4* 11.3*   HGB 9.9* 10.9* 10.8*   HCT 29.9* 33.1* 32.8*   PLT 15* 20* 36*     Recent Labs     12/27/20  0450 12/27/20  1839 12/28/20  0340 12/29/20  0540    142 144 137   K 3.9  --  4.0 3.7     --  114* 106 CO2 22*  --  22* 23   BUN 58*  --  41* 29*   CREATININE 1.3*  --  1.1 0.9   CALCIUM 8.4*  --  8.4* 8.4*     Recent Labs     12/27/20  0450   AST 12   ALT 17   BILIDIR <0.2   BILITOT 0.4   ALKPHOS 84     Recent Labs     12/27/20  0450   INR 0.89     No results for input(s): CKTOTAL, TROPONINI in the last 72 hours. Microbiology:    Blood culture #1:   Lab Results   Component Value Date    BC No growth-preliminary  12/25/2020    BC  12/25/2020     CORRECTED REPORT gram negative bacilli is CORRECT. The previous report of gram positive bacilli called Wendy Angulo RN on 12- at 01:17 was INCORRECT       Blood culture #2:No results found for: BLOODCULT2    Organism:  Lab Results   Component Value Date    ORG gram negative bacilli 12/25/2020       No results found for: LABGRAM    MRSA culture only:No results found for: [facility]    Urine culture:   Lab Results   Component Value Date    LABURIN [place] count: 10,000-50,000 CFU/mL  12/25/2020       Respiratory culture: No results found for: CULTRESP    Aerobic and Anaerobic :  No results found for: LABAERO  No results found for: LABANAE    Urinalysis:      Lab Results   Component Value Date    NITRU NEGATIVE 12/26/2020    WBCUA 25-50 12/26/2020    BACTERIA NONE SEEN 12/26/2020    RBCUA 50-75 12/26/2020    BLOODU LARGE 12/26/2020    [address] 1.009 12/26/2020       Radiology:  CT ABDOMEN PELVIS WO CONTRAST Additional Contrast? None   Final Result      1. Left ureteric stent in place. Increased density in the perinephric fat on the left side extending into the tail of the pancreas and left paracolic gutter suggestive of inflammatory process. 2. Amaya catheter in the bladder. Small amount of free air within the bladder. 3. Small amount of free fluid within the pelvis. 4. Lumbar scoliosis. 5. Small bilateral pleural effusions and atelectasis or infiltrate at both lung bases. **This report has been created using voice recognition software. It may contain minor errors which are inherent in voice recognition technology. **      Final report electronically signed by DR Charlee Schneider on 12/25/2020 11:34 AM      CT CHEST WO CONTRAST   Final Result       1. Postoperative changes involving the right and left chest walls and axillary regions. 2. Right breast implant in place. Left-sided Port-A-Cath. 3. Small bilateral pleural effusion and atelectasis or infiltrates at both lung bases. 4. Slight increased density in both lung fields. 5. Thoracic spondylosis. **This report has been created using voice recognition software. It may contain minor errors which are inherent in voice recognition technology. **      Final report electronically signed by DR Charlee Schneider on 12/25/2020 11:25 AM      XR CHEST PORTABLE   Final Result   Impression:   1. Small left pleural effusion.    2.  Mild passive venous congestion and minimal airspace disease lower lobes      This document has been electronically signed by: Florencia Youngblood MD on    12/25/2020 06:02 AM           Ct Abdomen Pelvis Wo Contrast Additional Contrast? None    Result Date: 12/25/2020 PROCEDURE: CT ABDOMEN PELVIS WO CONTRAST CLINICAL INFORMATION: abdominal pain . Covid positive. History of breast carcinoma. Ureteral stent. COMPARISON: None. TECHNIQUE: Axial 5 mm CT images were obtained through the abdomen and pelvis. No contrast was given. Coronal and sagittal reconstructions were obtained. All CT scans at this facility use dose modulation, iterative reconstruction, and/or weight-based dosing when appropriate to reduce radiation dose to as low as reasonably achievable. FINDINGS: There are small bilateral pleural effusions and atelectasis or infiltrate at both lung bases. Heart is within appropriate limits. The liver is grossly normal. The spleen is normal. There is increased density in the region of the left adrenal gland. The right adrenal gland and pancreas are grossly normal. The gallbladder is normal.  There is a left ureteric stent in place. There is increased density in the left perinephric fat and surrounding the tail of the pancreas and extending into the left paracolic gutter suggestive of inflammatory process. No abnormalities of the small bowel loops are noted. The IVC and aorta are of normal caliber. There is no adenopathy. There is a Amaya catheter in place and a small amount of free air within the bladder The urinary bladder is normal. There is a small amount of pelvic free fluid. The colon is grossly normal. There is no adenopathy. There is lumbar spondylosis. 1. Left ureteric stent in place. Increased density in the perinephric fat on the left side extending into the tail of the pancreas and left paracolic gutter suggestive of inflammatory process. 2. Amaya catheter in the bladder. Small amount of free air within the bladder. 3. Small amount of free fluid within the pelvis. 4. Lumbar scoliosis. 5. Small bilateral pleural effusions and atelectasis or infiltrate at both lung bases. **This report has been created using voice recognition software. It may contain minor errors which are inherent in voice recognition technology. ** Final report electronically signed by DR Uli Kaur on 12/25/2020 11:34 AM    Ct Chest Wo Contrast    Result Date: 12/25/2020  PROCEDURE: CT CHEST WO CONTRAST CLINICAL INFORMATION: covid. COMPARISON: Chest x-ray obtained on the same day. TECHNIQUE: 5 mm noncontrast axial images were obtained through the chest. Sagittal and coronal reconstructions were obtained. All CT scans at this facility use dose modulation, iterative reconstruction, and/or weight-based dosing when appropriate to reduce radiation dose to as low as reasonably achievable. FINDINGS: There are postoperative changes involving the right and left chest walls and bilateral axillary dissection. There is a right breast implant in place. There is a Port-A-Cath overlying the left hemithorax The heart size is normal. The aorta is of normal caliber. There is no gross abnormality of the pulmonary artery. There is no mediastinal, axillary or hilar adenopathy. There are small bilateral pleural effusions and atelectasis or infiltrates at both lung bases. There is increased density in both lung fields There is mild thoracic spondylosis. 1. Postoperative changes involving the right and left chest walls and axillary regions. 2. Right breast implant in place. Left-sided Port-A-Cath. 3. Small bilateral pleural effusion and atelectasis or infiltrates at both lung bases. 4. Slight increased density in both lung fields. 5. Thoracic spondylosis. **This report has been created using voice recognition software. It may contain minor errors which are inherent in voice recognition technology. ** Final report electronically signed by DR Anali Michelle on 12/25/2020 11:25 AM    Xr Chest Portable    Result Date: 12/25/2020  1 view chest x-ray. Comparison: None Findings: Portable AP view. No cardiomegaly. Small left pleural effusion Mild passive venous congestion. Minimal airspace disease both lower lobes. Left Mediport projected at the SVC. Surgical clips both axilla. Osseous structures intact. Impression: 1. Small left pleural effusion.  2.  Mild passive venous congestion and minimal airspace disease lower lobes This document has been electronically signed by: Matteo Pineda MD on 12/25/2020 06:02 AM       Electronically signed by Pérez Rothman PA-C on 12/29/2020 at 5:58 PM

## 2020-12-30 VITALS
BODY MASS INDEX: 32.89 KG/M2 | DIASTOLIC BLOOD PRESSURE: 67 MMHG | HEIGHT: 65 IN | TEMPERATURE: 98.5 F | OXYGEN SATURATION: 94 % | SYSTOLIC BLOOD PRESSURE: 135 MMHG | HEART RATE: 73 BPM | RESPIRATION RATE: 18 BRPM | WEIGHT: 197.4 LBS

## 2020-12-30 LAB
ANION GAP SERPL CALCULATED.3IONS-SCNC: 8 MEQ/L (ref 8–16)
BUN BLDV-MCNC: 21 MG/DL (ref 7–22)
CALCIUM SERPL-MCNC: 8.5 MG/DL (ref 8.5–10.5)
CHLORIDE BLD-SCNC: 104 MEQ/L (ref 98–111)
CO2: 23 MEQ/L (ref 23–33)
CREAT SERPL-MCNC: 0.8 MG/DL (ref 0.4–1.2)
ERYTHROCYTE [DISTWIDTH] IN BLOOD BY AUTOMATED COUNT: 12.7 % (ref 11.5–14.5)
ERYTHROCYTE [DISTWIDTH] IN BLOOD BY AUTOMATED COUNT: 45.5 FL (ref 35–45)
GFR SERPL CREATININE-BSD FRML MDRD: 70 ML/MIN/1.73M2
GLUCOSE BLD-MCNC: 88 MG/DL (ref 70–108)
HCT VFR BLD CALC: 32.6 % (ref 37–47)
HEMOGLOBIN: 10.6 GM/DL (ref 12–16)
MCH RBC QN AUTO: 32 PG (ref 26–33)
MCHC RBC AUTO-ENTMCNC: 32.5 GM/DL (ref 32.2–35.5)
MCV RBC AUTO: 98.5 FL (ref 81–99)
PLATELET # BLD: 59 THOU/MM3 (ref 130–400)
PMV BLD AUTO: 12.2 FL (ref 9.4–12.4)
POTASSIUM REFLEX MAGNESIUM: 4 MEQ/L (ref 3.5–5.2)
RBC # BLD: 3.31 MILL/MM3 (ref 4.2–5.4)
SODIUM BLD-SCNC: 135 MEQ/L (ref 135–145)
WBC # BLD: 10.5 THOU/MM3 (ref 4.8–10.8)

## 2020-12-30 PROCEDURE — 6370000000 HC RX 637 (ALT 250 FOR IP): Performed by: PHYSICIAN ASSISTANT

## 2020-12-30 PROCEDURE — 97116 GAIT TRAINING THERAPY: CPT

## 2020-12-30 PROCEDURE — 80048 BASIC METABOLIC PNL TOTAL CA: CPT

## 2020-12-30 PROCEDURE — 97535 SELF CARE MNGMENT TRAINING: CPT

## 2020-12-30 PROCEDURE — 99239 HOSP IP/OBS DSCHRG MGMT >30: CPT | Performed by: INTERNAL MEDICINE

## 2020-12-30 PROCEDURE — 97110 THERAPEUTIC EXERCISES: CPT

## 2020-12-30 PROCEDURE — 85027 COMPLETE CBC AUTOMATED: CPT

## 2020-12-30 RX ORDER — CARVEDILOL 3.12 MG/1
3.12 TABLET ORAL 2 TIMES DAILY WITH MEALS
Qty: 60 TABLET | Refills: 3 | Status: SHIPPED | OUTPATIENT
Start: 2020-12-30 | End: 2021-03-09

## 2020-12-30 RX ORDER — POLYETHYLENE GLYCOL 3350 17 G/17G
17 POWDER, FOR SOLUTION ORAL DAILY PRN
Qty: 527 G | Refills: 1 | Status: SHIPPED | OUTPATIENT
Start: 2020-12-30 | End: 2021-01-29

## 2020-12-30 RX ORDER — PROMETHAZINE HYDROCHLORIDE 12.5 MG/1
12.5 TABLET ORAL EVERY 6 HOURS PRN
Qty: 6 TABLET | Refills: 0 | Status: SHIPPED | OUTPATIENT
Start: 2020-12-30 | End: 2021-03-09

## 2020-12-30 RX ORDER — CIPROFLOXACIN 500 MG/1
500 TABLET, FILM COATED ORAL EVERY 12 HOURS SCHEDULED
Status: DISCONTINUED | OUTPATIENT
Start: 2020-12-30 | End: 2020-12-30 | Stop reason: HOSPADM

## 2020-12-30 RX ORDER — CIPROFLOXACIN 500 MG/1
500 TABLET, FILM COATED ORAL EVERY 12 HOURS SCHEDULED
Qty: 20 TABLET | Refills: 0 | Status: SHIPPED | OUTPATIENT
Start: 2020-12-30 | End: 2021-01-09

## 2020-12-30 RX ADMIN — CARVEDILOL 3.12 MG: 3.12 TABLET, FILM COATED ORAL at 09:53

## 2020-12-30 NOTE — DISCHARGE SUMMARY
Hospital Medicine Discharge Summary      Patient Identification:   Gerard Grajeda   :   MRN: [id number]   Account: [de-identified]      Patient's PCP: Trang Paredes MD    Admit Date: 2020     Discharge Date:   2020      Admitting Physician: Anthony eJan MD     Discharge Physician: Ruy Abernathy MD     Discharge Diagnoses: Active Hospital Problems    Diagnosis Date Noted    Anemia [D64.9]     Thrombocytopenia ([facility] [address] [D69.6]     Septic shock ([facility] [address] [A41.9, R65.21] 2020         Pyelonephritis        S/p Left uretur stent due to obstruction         Hx of breast Cancer stage 3    The patient was seen and examined on day of discharge and this discharge summary is in conjunction with any daily progress note from day of discharge. Hospital Course:     Gerard Grajeda is a 67 y.o. female admitted to [facility] on 2020 came from OSH, with abdominal pain and N/V. Hx of breast cancer stage 3, followed by [facility] team, has a mediport, and had COVID  Last month. Work up revealed sepsis due to klebsiella Pneumonia related to Pyelonephritis. Urology seen the pt , and left uretur stent was placed, due to stone/ obstruction. and she feels well and remains afebrile. Platelet count slowly rising, and will follow up with her hem/onc at [address] [place] [address]. Daughter who is a RN here , and I had a long d/w her and pt regarding labs, and answered their questions in detail. She will be transitioned to PO cipro for 10 days, today  I called and spoke with   Dr. Etienne Méndez, from ID and he was ok with discharge today. Denies any bleeding issues, eating well, no abdominal pain.          Exam:     Vitals:  Vitals:    20 2122 20 2334 20 0451 20 0940   BP: (!) 149/66 (!) 143/67 (!) 144/67 135/67   Pulse: 69 64 68 73   Resp: 16 18 16 18   Temp: 98.2 °F (36.8 °C) 98.4 °F (36.9 °C) 98.9 °F (37.2 °C) 98.5 °F (36.9 °C)   TempSrc: Oral Oral Oral Oral   SpO2: 95% 94% 94% 94% Weight:       Height:         Weight: Weight: 197 lb 6.4 oz (89.5 kg)     24 hour intake/output:    Intake/Output Summary (Last 24 hours) at 12/30/2020 1317  Last data filed at 12/30/2020 0514  Gross per 24 hour   Intake 1320 ml   Output 1700 ml   Net -380 ml         General appearance:  No apparent distress, appears stated age and cooperative. HEENT:  Normal cephalic, atraumatic without obvious deformity. Pupils equal, round, and reactive to light. Extra ocular muscles intact. Conjunctivae/corneas clear. Neck: Supple, with full range of motion. No jugular venous distention. Trachea midline. Respiratory:  Normal respiratory effort. Clear to auscultation, bilaterally without Rales/Wheezes/Rhonchi. Cardiovascular:  Regular rate and rhythm with normal S1/S2 without murmurs, rubs or gallops. Abdomen: Soft, non-tender, non-distended with normal bowel sounds. Musculoskeletal:  No clubbing, cyanosis or edema bilaterally. Full range of motion without deformity. Skin: Skin color, texture, turgor normal.  No rashes or lesions. Neurologic:  Neurovascularly intact without any focal sensory/motor deficits. Cranial nerves: II-XII intact, grossly non-focal.  Psychiatric:  Alert and oriented, thought content appropriate, normal insight  Capillary Refill: Brisk,< 3 seconds   Peripheral Pulses: +2 palpable, equal bilaterally       Labs:  For convenience and continuity at follow-up the following most recent labs are provided:      CBC:    Lab Results   Component Value Date    WBC 10.5 12/30/2020    HGB 10.6 12/30/2020    HCT 32.6 12/30/2020    PLT 59 12/30/2020       Renal:    Lab Results   Component Value Date     12/30/2020    K 4.0 12/30/2020     12/30/2020    CO2 23 12/30/2020    BUN 21 12/30/2020    CREATININE 0.8 12/30/2020    CALCIUM 8.5 12/30/2020    PHOS 4.3 12/25/2020         Significant Diagnostic Studies    Radiology:   CT ABDOMEN PELVIS WO CONTRAST Additional Contrast? None   Final Result 1. Left ureteric stent in place. Increased density in the perinephric fat on the left side extending into the tail of the pancreas and left paracolic gutter suggestive of inflammatory process. 2. Amaya catheter in the bladder. Small amount of free air within the bladder. 3. Small amount of free fluid within the pelvis. 4. Lumbar scoliosis. 5. Small bilateral pleural effusions and atelectasis or infiltrate at both lung bases. **This report has been created using voice recognition software. It may contain minor errors which are inherent in voice recognition technology. **      Final report electronically signed by DR Atiya Luciano on 12/25/2020 11:34 AM      CT CHEST WO CONTRAST   Final Result       1. Postoperative changes involving the right and left chest walls and axillary regions. 2. Right breast implant in place. Left-sided Port-A-Cath. 3. Small bilateral pleural effusion and atelectasis or infiltrates at both lung bases. 4. Slight increased density in both lung fields. 5. Thoracic spondylosis. **This report has been created using voice recognition software. It may contain minor errors which are inherent in voice recognition technology. **      Final report electronically signed by DR Atiya Luciano on 12/25/2020 11:25 AM      XR CHEST PORTABLE   Final Result   Impression:   1. Small left pleural effusion. 2.  Mild passive venous congestion and minimal airspace disease lower lobes      This document has been electronically signed by: Elva Hodgkins, MD on    12/25/2020 06:02 AM                Consults:     IP CONSULT TO UROLOGY  IP CONSULT TO HEM/ONC  IP CONSULT TO INFECTIOUS DISEASES    Disposition: Home  Condition at Discharge: Stable    Code Status:  Full Code     Patient Instructions:    Discharge lab work: CBC in one week and follow up with your PCP  Activity: activity as tolerated  Diet: DIET RENAL;      Follow-up visits:   No follow-up provider specified. Discharge Medications:      Ada Tomas   Home Medication Instructions GVB:[id number]    Printed on:12/30/20 1317   Medication Information                      Calcium Carb-Cholecalciferol (CALCIUM/VITAMIN D) 500-200 MG-UNIT TABS  Take 2 tablets by mouth daily             carvedilol (COREG) 3.125 MG tablet  Take 1 tablet by mouth 2 times daily (with meals)             ciprofloxacin (CIPRO) 500 MG tablet  Take 1 tablet by mouth every 12 hours for 10 days             hydrocortisone 1 % cream  Apply topically prior to radiation therapy             polyethylene glycol (GLYCOLAX) 17 g packet  Take 17 g by mouth daily as needed for Constipation             promethazine (PHENERGAN) 12.5 MG tablet  Take 1 tablet by mouth every 6 hours as needed for Nausea                 Time Spent on discharge is more than 45 minutes in the examination, evaluation, counseling and review of medications and discharge plan. Discharge Medications for PCI/MI (performed or attempted):   NA           Signed: Thank you Thomas Guerrero MD for the opportunity to be involved in this patient's care.     Electronically signed by Zayra Perkins MD on 12/30/2020 at 1:17 PM

## 2020-12-30 NOTE — PROGRESS NOTES
Sai Waldron [address]  Occupational Therapy  Daily Note  Time:   Time In: 0800  Time Out: 0781  Timed Code Treatment Minutes: 23 Minutes  Minutes: 23          Date: 2020  Patient Name: Zach Banks,   Gender: female      Room: [facility]012-  MRN: [id number]  : 1945  (67 y.o.)  Referring Practitioner: Gayle Ramirez PA-C  Diagnosis: severe sepsis with septic shock  Additional Pertinent Hx: Verena Rdz is a 50-year-old white female who presented to [facility] on 2020 as a transfer from [address] [place] [address] for complaints of abdominal pain nausea and vomiting. She has a past medical history of lifetime non-smoker, stage II breast cancer, radical mastectomy on 2020 with chemo. Currently receiving radiation. Does have history of COVID-19 infection 2020. Per report patient presented to [facility] with complaints abdominal pain nausea and vomiting on 2020. She was having some dysuria. Patient was given Toradol, Norco, Flomax, Zofran. Urine was noted to be cloudy CT abdomen pelvis was completed without contrast that showed multiple calculi in the distal left ureter and within the left UV junction each measuring about 2 mm. Mild to moderate hydronephrosis was noted. She was discharged home. On 2020 she presented back to the [address] [place] [address] system emergency department with altered mental status and weakness. Family identified at that time that she was having confusion. Daughter stated that she was have difficulty walking and was saying inappropriate things. He had been prescribed Norco on previous visit but denied that she had taken any. She was transferred to [facility] and underwent stent placement with urology. She was no levophed, which has been stopped. She will transfer to .  \"    Restrictions/Precautions:  Restrictions/Precautions: General Precautions, Fall Risk SUBJECTIVE: pt sitting in on RTS when arrived. Pt agreeable to OT and getting cleaned up     PAIN: pt did not state any pain during session     COGNITION: Decreased Problem Solving and Decreased Safety Awareness    ADL:   Grooming: Contact Guard Assistance. standing at sink to comb hair and brush teeth   Bathing: Contact Guard Assistance. to complete in standing   Upper Extremity Dressing: Minimal Assistance. to tie gown   Toileting: Contact Guard Assistance, Minimal Assistance, with verbal cues  and with increased time for completion. for clompleteness of bottom care after BM   Toilet Transfer: Stand By Assistance. from RTS . BALANCE:  Standing Balance: Contact Guard Assistance. at sink     BED MOBILITY:  Not Tested    TRANSFERS:  Sit to Stand:  Contact Guard Assistance. from RTS   Stand to Sit: Contact Guard Assistance. to recliner     FUNCTIONAL MOBILITY:  Assistive Device: Rolling Walker  Assist Level:  Contact Guard Assistance. Distance: To and from bathroom and into hallway of unit   Pt had no LOB and demo good posture throughout      ASSESSMENT:     Activity Tolerance:  Patient tolerance of  treatment: good.        Discharge Recommendations: Continue to assess pending progress, Home with assist PRN, Home with Home health OT   Equipment Recommendations: Equipment Needed: Yes(Possible RW)  Plan: Times per week: 5x  Times per day: Daily  Current Treatment Recommendations: Strengthening, Safety Education & Training, Balance Training, Endurance Training    Patient Education  Patient Education: ADL's and Importance of Increasing Activity    Goals  Short term goals  Time Frame for Short term goals: by d/c  Short term goal 1: Pt will complete functional mobility HH distances with LRAE and SBA with no LOB to increase indep with ADL routine  Short term goal 2: Pt will complete various t/fs with SBA and no VC for safety to increase indep with toileting Short term goal 3: Pt will tolerate dynamic standing x7 min with SBA and min SOB to increase indep with grooming  Short term goal 4: Pt will complete LB ADL with AE prn to increase indep with dressing  Long term goals  Time Frame for Long term goals : no LTG d/t short ELOS    Following session, patient left in safe position with all fall risk precautions in place.

## 2020-12-30 NOTE — PROGRESS NOTES
[address] [address]  INPATIENT PHYSICAL THERAPY  DAILY NOTE  OJ CORDOVAS 4A - 4A-12/012-A    Time In: 1104  Time Out: 8167  Timed Code Treatment Minutes: 23 Minutes  Minutes: 23          Date: 2020  Patient Name: Darius Hernandez,  Gender:  female        MRN: [id number]  : 1945  (67 y.o.)     Referring Practitioner: SUELLEN Villarreal  Diagnosis: severe sepsis with septic shock  Additional Pertinent Hx: [place] Ramirez is a 42-year-old white female who presented to [facility] on 2020 as a transfer from [address] [place] [address] for complaints of abdominal pain nausea and vomiting. She has a past medical history of lifetime non-smoker, stage II breast cancer, radical mastectomy on 2020 with chemo. Currently receiving radiation. Does have history of COVID-19 infection 2020. Per report patient presented to [facility] with complaints abdominal pain nausea and vomiting on 2020. She was having some dysuria. Patient was given Toradol, Norco, Flomax, Zofran. Urine was noted to be cloudy CT abdomen pelvis was completed without contrast that showed multiple calculi in the distal left ureter and within the left UV junction each measuring about 2 mm. Mild to moderate hydronephrosis was noted. She was discharged home. On 2020 she presented back to the [address] [place] [address] system emergency department with altered mental status and weakness. Family identified at that time that she was having confusion. Daughter stated that she was have difficulty walking and was saying inappropriate things. He had been prescribed Norco on previous visit but denied that she had taken any. She was transferred to [facility] and underwent stent placement with urology. She was no levophed, which has been stopped. She will transfer to .  \"     Prior Level of Function:  Lives With: Spouse  Type of Home: House Home Layout: Two level, Performs ADL's on one level, Able to Live on Main level with bedroom/bathroom(do not usually use the second level)  Home Access: Stairs to enter without rails  Entrance Stairs - Number of Steps: 1   Bathroom Shower/Tub: Tub/Shower unit  Bathroom Toilet: Handicap height  Bathroom Equipment: Grab bars in shower    ADL Assistance: [address]: Independent  Homemaking Responsibilities: Yes  Ambulation Assistance: Independent  Transfer Assistance: Independent  Active : Yes  Additional Comments: no AD used PTA    Restrictions/Precautions:  Restrictions/Precautions: General Precautions, Fall Risk     SUBJECTIVE: Rn approved PT treatment. Upon entry, pt reclined in bedside chair, pleasant and agreeable to participate in therapy. Daughter also present. Post session, pt seated In bedside chair for lunch with all needs within reach and chair alarm on. PAIN: denies    OBJECTIVE:  Bed Mobility:  Not Tested    Transfers:  Sit to Stand: Stand By Assistance, cues for hand placement  Stand to Sit:Stand By Assistance, to/from chair with arms    Ambulation:  CGA when amb without AD, SBA when amb with RW  Distance: 80ft without AD. 150ft with RW  Surface: Level Tile  Device:without AD and with RW  Gait Deviations: Without AD: Forward Flexed Posture, Slow Lindsay, Decreased Step Length Bilaterally, Decreased Gait Speed, Mild Path Deviations and Unsteady Gait  With RW: Forward Flexed Posture, Slow Lindsay, Decreased Step Length Bilaterally, Decreased Gait Speed, improved stability noted    Balance:  Static Standing Balance: Stand By Assistance  Dynamic Standing Balance: Contact Guard Assistance    Stairs:  Stairs:  6\" steps. X 3 steps using Bilateral Handrails and Stand By Assistance. Platform:  6\" platform X 2 trials using 1 trial with RW, 1 trial without AD and Contact Guard Assistance, with verbal cues , with increased time for completion.     Exercise: Patient was guided in 1 set(s) 10-15 reps of exercise to both lower extremities. Glut sets, Seated marches, Seated hamstring curls, Seated heel/toe raises, Long arc quads and Seated isometric hip adduction. Exercises were completed for increased independence with functional mobility. Functional Outcome Measures: Completed  AM-PAC Inpatient Mobility Raw Score : 19  AM-PAC Inpatient T-Scale Score : 45.44    ASSESSMENT:  Assessment: Patient progressing toward established goals. Activity Tolerance:  Patient tolerance of  treatment: good. Demonstrates slight instability when amb without an AD. Discussed RW for pt and daughter stated she would find someone to borrow from for pt at d/c, as stability improves when amb with RW. Equipment Recommendations: Other: monitor for needs, pt may benefit from RW at discharge  Discharge Recommendations:  Home with Home health PT    Plan: Times per week: 5X GM  Times per day: Daily  Specific instructions for Next Treatment: therex, mobility, balance activities, monitor for need for RW at discharge    Patient Education  Patient Education: Plan of Care, Equipment Education, Transfers, Gait, Stairs, Verbal Exercise Instruction    Goals:  Patient goals : return home  Short term goals  Time Frame for Short term goals: by discharge  Short term goal 1: bed mobility with I to get in/out of bed  Short term goal 2: transfer with MOD I to get in/out of chairs  Short term goal 3: amb >150'x1 with LRAD or no AD and MOD I to walk safely in home  Short term goal 4: negotiate 1 step without HR and S to enter home safely  Long term goals  Time Frame for Long term goals : no LTGs set secondary to short ELOS    Following session, patient left in safe position with all fall risk precautions in place.
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
## 2020-12-30 NOTE — SUICIDE SAFETY PLAN
SAFETY PLAN    A suicide Safety Plan is a document that supports someone when they are having thoughts of suicide. Warning Signs that indicate a suicidal crisis may be developing: What (situations, thoughts, feelings, body sensations, behaviors, etc.) do you experience that lets you know you are beginning to think about suicide? 1. ***  2. ***  3. ***    Internal Coping Strategies:  What things can I do (relaxation techniques, hobbies, physical activities, etc.) to take my mind off my problems without contacting another person? 1. ***  2. ***  3. ***    People and social settings that provide distraction: Who can I call or where can I go to distract me? 1. Name: ***  Phone: ***  2. Name: ***  Phone: ***   3. Place: ***            4. Place: ***    People whom I can ask for help: Who can I call when I need help - for example, friends, family, clergy, someone else? 1. Name: ***                Phone: ***  2. Name: ***  Phone: ***  3. Name: ***  Phone: ***    Professionals or [facility] agencies I can contact during a crisis: Who can I call for help - for example, my doctor, my psychiatrist, my psychologist, a mental health provider, a suicide hotline? 1. Clinician Name: ***   Phone: ***      Clinician Pager or Emergency Contact #: ***    2. Clinician Name: ***   Phone: ***      Clinician Pager or Emergency Contact #: ***    3. Suicide Prevention Lifeline: 9-686-247-TALK (7404)    4. [address] [place] [address] Emergency Services -  for example, [facility] suicide hotline, [facility] Hotline: ***      Emergency Services Address: ***      Emergency Services Phone: ***    Making the environment safe: How can I make my environment (house/apartment/living space) safer? For example, can I remove guns, medications, and other items?   1. ***  2. ***

## 2020-12-31 LAB
BLOOD CULTURE, ROUTINE: ABNORMAL
BLOOD CULTURE, ROUTINE: ABNORMAL
ORGANISM: ABNORMAL

## 2021-01-12 ENCOUNTER — TELEPHONE (OUTPATIENT)
Dept: UROLOGY | Age: 76
End: 2021-01-12

## 2021-01-12 DIAGNOSIS — Z01.818 PREOP TESTING: ICD-10-CM

## 2021-01-12 DIAGNOSIS — N20.0 KIDNEY STONE: Primary | ICD-10-CM

## 2021-01-12 NOTE — TELEPHONE ENCOUNTER
Patient scheduled for surgery with Dr. Robby Villeda on 01- at 9:30am with an arrival of 7:30am.  Preop orders and instructions given verbally to patient.   Surgery consent to be signed on arrival.  Covid and urinalysis to be done at [facility].

## 2021-01-12 NOTE — TELEPHONE ENCOUNTER
Surgery scheduled for 01- with Dr. Brandy Lyman.   Order for Covid and urinalysis faxed to [facility]AKARI [facility]HE[facility].

## 2021-01-12 NOTE — TELEPHONE ENCOUNTER
DO NOT TAKE ASPIRIN, IBUPROFEN, MOTRIN-LIKE DRUGS AND ANY MULTIVITAMINS OR OVER THE COUNTER SUPPLEMENTS 5 DAYS PRIOR TO SURGERY AND 3 DAYS FOLLOWING     Dennise Loo 1945 Diagnosis: Kidney stone    Surgical Physician: Dr. Graciela Israel have been scheduled for the procedure marked below:      Surgery: Cystoscopy, right ureteroscopy, laser lithotripsy, basket retrieval of stone fragments, and possible right ureteral stent placement         Date: 01-     Anesthesia: Anesthesiologist (General/Spinal)     Place of Service: [address] [place] [address] Second Floor Same Day Surgery         Arrive to same day surgery by:  7:30am   (Surgery time is subject to change)      INSTRUCTIONS AS MARKED BELOW:    1. DO NOT eat or drink anything after midnight before surgery. 2. We prefer you shower or bathe with an antibacterial soap (Dial) the morning of surgery. 3.  Please ensure to have a  with you to transport you home. 4.  Please bring a current medication list, photo ID and insurance card(s) with you  5. Okay to take Tylenol  6. Take blood pressure or heart medication as directed, if taken in the morning take with a small sip of water  7. The office will call you in 1-2 days after your procedure to schedule a follow up.       (Covid-19 screening is date sensitive and can only be done 5 to 7 days prior to your procedure)    Date: 1/12/2021

## 2021-01-12 NOTE — TELEPHONE ENCOUNTER
SURGERY [address] [place] [address] [address] [employer], [address]      Phone *[phone] *[phone]   Surgical Scheduling Direct Line Phone *[phone] Fax *[phone]      Kirit Kilgore 1945 female    Kai Baig [phone] Marital Status:          Home Phone: [phone]      Cell Phone:    Telephone Information:   Mobile [phone]          Surgeon: Dr. Amy Crawford  Surgery Date: 01-   Time: TF the 8:01am case    Procedure: Cystoscopy, right ureteroscopy, laser lithotripsy, basket retrieval of stone fragments, and possible right ureteral stent placement    Diagnosis: Kidney stone     Important Medical History: In Epic    Special Inst/Equip:     CPT Codes:    72975  Latex Allergy:  No     Cardiac Device:  No     Anesthesia:  Anesthesiologist (General/Spinal)          Admission Type:  Same Day                             Admit Prior to Day of Surgery: No    Case Location:  Main OR           Preadmission Testing:Phone Call              PAT Date and Time:______________________________________________________    PAT Confirmation #: ______________________________________________________    Post Op Visit: ___________________________________________________________    Need Preop Cardiac Clearance: No    Does Patient have Cardiologist/physician?      None    Surgery Confirmation #: __________________________________________________    : ________________________   Date: __________________________     [employer] Name: Medicare

## 2021-01-14 LAB
BASOPHILS ABSOLUTE: 0.03 /ΜL
BASOPHILS RELATIVE PERCENT: 0.6 %
BILIRUBIN URINE: NORMAL
BLOOD, URINE: NORMAL
CLARITY: CLEAR
COLOR: YELLOW
EOSINOPHILS ABSOLUTE: 0.46 /ΜL
EOSINOPHILS RELATIVE PERCENT: 9.1 %
GLUCOSE URINE: NEGATIVE
HCT VFR BLD CALC: 29.8 % (ref 36–46)
HEMOGLOBIN: 9.8 G/DL (ref 12–16)
KETONES, URINE: NEGATIVE
LEUKOCYTE ESTERASE, URINE: NORMAL
LYMPHOCYTES ABSOLUTE: 0.94 /ΜL
LYMPHOCYTES RELATIVE PERCENT: 18.7 %
MCH RBC QN AUTO: 29.2 PG
MCHC RBC AUTO-ENTMCNC: 32.8 G/DL
MCV RBC AUTO: 88.9 FL
MONOCYTES ABSOLUTE: 0.54 /ΜL
MONOCYTES RELATIVE PERCENT: 10.7 %
NEUTROPHILS ABSOLUTE: 3.04 /ΜL
NEUTROPHILS RELATIVE PERCENT: 60.5 %
NITRITE, URINE: NEGATIVE
PDW BLD-RTO: ABNORMAL %
PH UA: 6.5 (ref 4.5–8)
PLATELET # BLD: 179 K/ΜL
PMV BLD AUTO: 9.6 FL
PROTEIN UA: NEGATIVE
RBC # BLD: 3.35 10^6/ΜL
SPECIFIC GRAVITY UA: NORMAL
UROBILINOGEN, URINE: NORMAL
WBC # BLD: 5.03 10^3/ML

## 2021-01-15 ENCOUNTER — PREP FOR PROCEDURE (OUTPATIENT)
Dept: UROLOGY | Age: 76
End: 2021-01-15

## 2021-01-15 ENCOUNTER — TELEPHONE (OUTPATIENT)
Dept: UROLOGY | Age: 76
End: 2021-01-15

## 2021-01-15 RX ORDER — SODIUM CHLORIDE 9 MG/ML
INJECTION, SOLUTION INTRAVENOUS CONTINUOUS
Status: CANCELLED | OUTPATIENT
Start: 2021-01-20

## 2021-01-20 ENCOUNTER — ANESTHESIA EVENT (OUTPATIENT)
Dept: OPERATING ROOM | Age: 76
End: 2021-01-20
Payer: MEDICARE

## 2021-01-20 ENCOUNTER — HOSPITAL ENCOUNTER (OUTPATIENT)
Age: 76
Setting detail: OUTPATIENT SURGERY
Discharge: HOME | End: 2021-01-20
Attending: UROLOGY | Admitting: UROLOGY
Payer: MEDICARE

## 2021-01-20 ENCOUNTER — ANESTHESIA (OUTPATIENT)
Dept: OPERATING ROOM | Age: 76
End: 2021-01-20
Payer: MEDICARE

## 2021-01-20 VITALS
RESPIRATION RATE: 16 BRPM | OXYGEN SATURATION: 98 % | BODY MASS INDEX: 26.54 KG/M2 | DIASTOLIC BLOOD PRESSURE: 62 MMHG | HEIGHT: 65 IN | SYSTOLIC BLOOD PRESSURE: 140 MMHG | HEART RATE: 76 BPM | TEMPERATURE: 97.6 F | WEIGHT: 159.3 LBS

## 2021-01-20 VITALS — OXYGEN SATURATION: 98 % | SYSTOLIC BLOOD PRESSURE: 102 MMHG | DIASTOLIC BLOOD PRESSURE: 46 MMHG

## 2021-01-20 DIAGNOSIS — G89.18 POSTOPERATIVE PAIN: Primary | ICD-10-CM

## 2021-01-20 PROCEDURE — 3700000001 HC ADD 15 MINUTES (ANESTHESIA): Performed by: UROLOGY

## 2021-01-20 PROCEDURE — 2580000003 HC RX 258

## 2021-01-20 PROCEDURE — 3600000003 HC SURGERY LEVEL 3 BASE: Performed by: UROLOGY

## 2021-01-20 PROCEDURE — C1769 GUIDE WIRE: HCPCS | Performed by: UROLOGY

## 2021-01-20 PROCEDURE — 7100000011 HC PHASE II RECOVERY - ADDTL 15 MIN: Performed by: UROLOGY

## 2021-01-20 PROCEDURE — 6360000002 HC RX W HCPCS

## 2021-01-20 PROCEDURE — 2500000003 HC RX 250 WO HCPCS: Performed by: NURSE ANESTHETIST, CERTIFIED REGISTERED

## 2021-01-20 PROCEDURE — 6360000002 HC RX W HCPCS: Performed by: NURSE ANESTHETIST, CERTIFIED REGISTERED

## 2021-01-20 PROCEDURE — 6360000002 HC RX W HCPCS: Performed by: UROLOGY

## 2021-01-20 PROCEDURE — 2720000010 HC SURG SUPPLY STERILE: Performed by: UROLOGY

## 2021-01-20 PROCEDURE — C2617 STENT, NON-COR, TEM W/O DEL: HCPCS | Performed by: UROLOGY

## 2021-01-20 PROCEDURE — 3600000013 HC SURGERY LEVEL 3 ADDTL 15MIN: Performed by: UROLOGY

## 2021-01-20 PROCEDURE — 2709999900 HC NON-CHARGEABLE SUPPLY: Performed by: UROLOGY

## 2021-01-20 PROCEDURE — 3700000000 HC ANESTHESIA ATTENDED CARE: Performed by: UROLOGY

## 2021-01-20 PROCEDURE — 7100000000 HC PACU RECOVERY - FIRST 15 MIN: Performed by: UROLOGY

## 2021-01-20 PROCEDURE — 7100000010 HC PHASE II RECOVERY - FIRST 15 MIN: Performed by: UROLOGY

## 2021-01-20 PROCEDURE — 87081 CULTURE SCREEN ONLY: CPT

## 2021-01-20 PROCEDURE — 7100000001 HC PACU RECOVERY - ADDTL 15 MIN: Performed by: UROLOGY

## 2021-01-20 PROCEDURE — C1758 CATHETER, URETERAL: HCPCS | Performed by: UROLOGY

## 2021-01-20 DEVICE — URETERAL STENT
Type: IMPLANTABLE DEVICE | Status: FUNCTIONAL
Brand: PERCUFLEX™ PLUS

## 2021-01-20 RX ORDER — MIDAZOLAM HYDROCHLORIDE 1 MG/ML
INJECTION INTRAMUSCULAR; INTRAVENOUS PRN
Status: DISCONTINUED | OUTPATIENT
Start: 2021-01-20 | End: 2021-01-20 | Stop reason: SDUPTHER

## 2021-01-20 RX ORDER — LABETALOL 20 MG/4 ML (5 MG/ML) INTRAVENOUS SYRINGE
5 EVERY 10 MIN PRN
Status: DISCONTINUED | OUTPATIENT
Start: 2021-01-20 | End: 2021-01-20 | Stop reason: HOSPADM

## 2021-01-20 RX ORDER — HYDROCODONE BITARTRATE AND ACETAMINOPHEN 5; 325 MG/1; MG/1
1 TABLET ORAL EVERY 4 HOURS PRN
Qty: 18 TABLET | Refills: 0 | Status: SHIPPED | OUTPATIENT
Start: 2021-01-20 | End: 2021-01-23

## 2021-01-20 RX ORDER — FENTANYL CITRATE 50 UG/ML
50 INJECTION, SOLUTION INTRAMUSCULAR; INTRAVENOUS EVERY 5 MIN PRN
Status: DISCONTINUED | OUTPATIENT
Start: 2021-01-20 | End: 2021-01-20 | Stop reason: HOSPADM

## 2021-01-20 RX ORDER — MEPERIDINE HYDROCHLORIDE 25 MG/ML
12.5 INJECTION INTRAMUSCULAR; INTRAVENOUS; SUBCUTANEOUS EVERY 5 MIN PRN
Status: DISCONTINUED | OUTPATIENT
Start: 2021-01-20 | End: 2021-01-20 | Stop reason: HOSPADM

## 2021-01-20 RX ORDER — PROPOFOL 10 MG/ML
INJECTION, EMULSION INTRAVENOUS PRN
Status: DISCONTINUED | OUTPATIENT
Start: 2021-01-20 | End: 2021-01-20 | Stop reason: SDUPTHER

## 2021-01-20 RX ORDER — DIPHENHYDRAMINE HYDROCHLORIDE 50 MG/ML
12.5 INJECTION INTRAMUSCULAR; INTRAVENOUS
Status: DISCONTINUED | OUTPATIENT
Start: 2021-01-20 | End: 2021-01-20 | Stop reason: HOSPADM

## 2021-01-20 RX ORDER — FENTANYL CITRATE 50 UG/ML
INJECTION, SOLUTION INTRAMUSCULAR; INTRAVENOUS PRN
Status: DISCONTINUED | OUTPATIENT
Start: 2021-01-20 | End: 2021-01-20 | Stop reason: SDUPTHER

## 2021-01-20 RX ORDER — LIDOCAINE HCL/PF 100 MG/5ML
SYRINGE (ML) INJECTION PRN
Status: DISCONTINUED | OUTPATIENT
Start: 2021-01-20 | End: 2021-01-20 | Stop reason: SDUPTHER

## 2021-01-20 RX ORDER — METOCLOPRAMIDE HYDROCHLORIDE 5 MG/ML
10 INJECTION INTRAMUSCULAR; INTRAVENOUS
Status: DISCONTINUED | OUTPATIENT
Start: 2021-01-20 | End: 2021-01-20 | Stop reason: HOSPADM

## 2021-01-20 RX ORDER — OXYBUTYNIN CHLORIDE 10 MG/1
10 TABLET, EXTENDED RELEASE ORAL DAILY
Qty: 5 TABLET | Refills: 2 | Status: SHIPPED | OUTPATIENT
Start: 2021-01-20 | End: 2021-03-09

## 2021-01-20 RX ORDER — EPHEDRINE SULFATE/0.9% NACL/PF 50 MG/5 ML
SYRINGE (ML) INTRAVENOUS PRN
Status: DISCONTINUED | OUTPATIENT
Start: 2021-01-20 | End: 2021-01-20 | Stop reason: SDUPTHER

## 2021-01-20 RX ORDER — ONDANSETRON 2 MG/ML
INJECTION INTRAMUSCULAR; INTRAVENOUS PRN
Status: DISCONTINUED | OUTPATIENT
Start: 2021-01-20 | End: 2021-01-20 | Stop reason: SDUPTHER

## 2021-01-20 RX ORDER — PROMETHAZINE HYDROCHLORIDE 25 MG/ML
12.5 INJECTION, SOLUTION INTRAMUSCULAR; INTRAVENOUS
Status: DISCONTINUED | OUTPATIENT
Start: 2021-01-20 | End: 2021-01-20 | Stop reason: HOSPADM

## 2021-01-20 RX ORDER — CEPHALEXIN 500 MG/1
500 CAPSULE ORAL 3 TIMES DAILY
Qty: 15 CAPSULE | Refills: 0 | Status: SHIPPED | OUTPATIENT
Start: 2021-01-20 | End: 2021-01-25

## 2021-01-20 RX ORDER — SODIUM CHLORIDE 9 MG/ML
INJECTION, SOLUTION INTRAVENOUS CONTINUOUS
Status: DISCONTINUED | OUTPATIENT
Start: 2021-01-20 | End: 2021-01-20 | Stop reason: HOSPADM

## 2021-01-20 RX ORDER — FENTANYL CITRATE 50 UG/ML
25 INJECTION, SOLUTION INTRAMUSCULAR; INTRAVENOUS EVERY 5 MIN PRN
Status: DISCONTINUED | OUTPATIENT
Start: 2021-01-20 | End: 2021-01-20 | Stop reason: HOSPADM

## 2021-01-20 RX ORDER — HEPARIN SODIUM (PORCINE) LOCK FLUSH IV SOLN 100 UNIT/ML 100 UNIT/ML
500 SOLUTION INTRAVENOUS PRN
Status: DISCONTINUED | OUTPATIENT
Start: 2021-01-20 | End: 2021-01-20 | Stop reason: HOSPADM

## 2021-01-20 RX ADMIN — FENTANYL CITRATE 50 MCG: 50 INJECTION, SOLUTION INTRAMUSCULAR; INTRAVENOUS at 09:10

## 2021-01-20 RX ADMIN — FENTANYL CITRATE 50 MCG: 50 INJECTION, SOLUTION INTRAMUSCULAR; INTRAVENOUS at 09:13

## 2021-01-20 RX ADMIN — MIDAZOLAM HYDROCHLORIDE 2 MG: 1 INJECTION, SOLUTION INTRAMUSCULAR; INTRAVENOUS at 09:03

## 2021-01-20 RX ADMIN — PROPOFOL 150 MG: 10 INJECTION, EMULSION INTRAVENOUS at 09:10

## 2021-01-20 RX ADMIN — Medication 10 MG: at 09:27

## 2021-01-20 RX ADMIN — SODIUM CHLORIDE: 9 INJECTION, SOLUTION INTRAVENOUS at 08:57

## 2021-01-20 RX ADMIN — CEFAZOLIN 2 G: 10 INJECTION, POWDER, FOR SOLUTION INTRAVENOUS at 09:13

## 2021-01-20 RX ADMIN — Medication 50 MG: at 09:10

## 2021-01-20 RX ADMIN — ONDANSETRON HYDROCHLORIDE 4 MG: 4 INJECTION, SOLUTION INTRAMUSCULAR; INTRAVENOUS at 09:40

## 2021-01-20 RX ADMIN — HEPARIN 500 UNITS: 100 SYRINGE at 12:17

## 2021-01-20 RX ADMIN — Medication 10 MG: at 09:24

## 2021-01-20 SDOH — HEALTH STABILITY: MENTAL HEALTH: HOW OFTEN DO YOU HAVE A DRINK CONTAINING ALCOHOL?: NEVER

## 2021-01-20 ASSESSMENT — PULMONARY FUNCTION TESTS
PIF_VALUE: 11
PIF_VALUE: 13
PIF_VALUE: 13
PIF_VALUE: 11
PIF_VALUE: 0
PIF_VALUE: 11
PIF_VALUE: 0
PIF_VALUE: 0
PIF_VALUE: 11
PIF_VALUE: 13
PIF_VALUE: 20
PIF_VALUE: 2
PIF_VALUE: 11
PIF_VALUE: 11
PIF_VALUE: 10
PIF_VALUE: 11
PIF_VALUE: 1
PIF_VALUE: 11
PIF_VALUE: 1
PIF_VALUE: 11
PIF_VALUE: 11
PIF_VALUE: 10
PIF_VALUE: 13

## 2021-01-20 ASSESSMENT — PAIN SCALES - GENERAL
PAINLEVEL_OUTOF10: 0
PAINLEVEL_OUTOF10: 0

## 2021-01-20 ASSESSMENT — PAIN - FUNCTIONAL ASSESSMENT: PAIN_FUNCTIONAL_ASSESSMENT: 0-10

## 2021-01-20 NOTE — PROGRESS NOTES
ADMITTED TO SDS AND ORIENTED TO UNIT. SCDS ON. FALL AND ALLERGY BANDS ON. PT VERBALIZED APPROVAL FOR FIRST NAME, LAST INITIAL AND PHYSICIAN NAME ON UNIT WHITEBOARD. Spouse, Bill with the patient.

## 2021-01-20 NOTE — FLOWSHEET NOTE
Pt declined to ambulate to the bathroom to void. Call light in reach, pt verbalized understanding of use.

## 2021-01-20 NOTE — ANESTHESIA POSTPROCEDURE EVALUATION
Department of Anesthesiology  Postprocedure Note    Patient: Titi Patton  MRN: [id number]  YOB: 1945  Date of evaluation: 1/20/2021  Time:  11:16 AM     Procedure Summary     Date: 01/20/21 Room / Location: Count includes the [facility] 10 / Count includes the [facility]    Anesthesia Start: 0908 Anesthesia Stop: 1693    Procedure: CYSTOSCOPY, LEFT URETEROSCOPY, LEFT URETERAL STENT PLACEMENT (Left Bladder) Diagnosis: (KIDNEY STONE)    Surgeons: Morgan Bartholomew MD Responsible Provider: Darby Sequeira MD    Anesthesia Type: general ASA Status: 2          Anesthesia Type: general    Dani Phase I: Dani Score: 10    Dani Phase II: Dani Score: 10    Last vitals: Reviewed and per EMR flowsheets. Anesthesia Post Evaluation    Patient location during evaluation: PACU  Patient participation: complete - patient participated  Level of consciousness: awake and alert  Airway patency: patent  Nausea & Vomiting: no nausea and no vomiting  Complications: no  Cardiovascular status: hemodynamically stable  Respiratory status: acceptable  Hydration status: euvolemic      [facility]  POST-ANESTHESIA NOTE       Name:  Titi Patton                                         Age:  67 y.o.   MRN:  [id number]      Last Vitals:  BP (!) 154/72   Pulse 70   Temp 97.6 °F (36.4 °C) (Temporal)   Resp 16   Ht 5' 5\" (1.651 m)   Wt 159 lb 4.8 oz (72.3 kg)   SpO2 98%   BMI 26.51 kg/m²   Patient Vitals for the past 4 hrs:   BP Temp Temp src Pulse Resp SpO2 Height Weight   01/20/21 1029 (!) 154/72 97.6 °F (36.4 °C) Temporal 70 16 98 %     01/20/21 1012 (!) 152/70   68 10 96 %     01/20/21 1008 (!) 152/70   67 8 95 %     01/20/21 1002 (!) 148/69   71 13 96 %     01/20/21 0956 (!) 151/68   76 10 96 %     01/20/21 0952 (!) 140/58   76 11 95 %     01/20/21 0945 (!) 145/67 97.7 °F (36.5 °C) Temporal 84  96 %     01/20/21 0806 129/84 98.5 °F (36.9 °C) Temporal 85 18 95 % 5' 5\" (1.651 m) 159 lb 4.8 oz (72.3 kg) Level of Consciousness:  Awake    Respiratory:  Stable    Oxygen Saturation:  Stable    Cardiovascular:  Stable    Hydration:  Adequate    PONV:  Stable    Post-op Pain:  Adequate analgesia    Post-op Assessment:  No apparent anesthetic complications    Additional Follow-Up / Treatment / Comment:  None    Johanne Goff MD  January 20, 2021   11:16 AM

## 2021-01-20 NOTE — PROGRESS NOTES

## 2021-01-20 NOTE — ANESTHESIA PRE PROCEDURE
Department of Anesthesiology  Preprocedure Note       Name:  Oneyda Urbina   Age:  67 y.o.  :  1945                                          MRN:  [id number]         Date:  2021      Surgeon: Melissa Cordova):  Adis Schmitz MD    Procedure: Procedure(s):  CYSTOSCOPY, RIGHT  URETEROSCOPY,  LASER LITHOTRIPSY, BASKET RETRIEVAL OF STONE FRAGMENTS AND POSSIBLE RIGHT URETERAL STENT PLACEMENT    Medications prior to admission:   Prior to Admission medications    Medication Sig Start Date End Date Taking? Authorizing Provider   carvedilol (COREG) 3.125 MG tablet Take 1 tablet by mouth 2 times daily (with meals) 20   Izaiah St MD   polyethylene glycol (GLYCOLAX) 17 g packet Take 17 g by mouth daily as needed for Constipation 20  Tyler MD Alejandra   promethazine (PHENERGAN) 12.5 MG tablet Take 1 tablet by mouth every 6 hours as needed for Nausea 20   Izaiah St MD   Calcium Carb-Cholecalciferol (CALCIUM/VITAMIN D) 500-200 MG-UNIT TABS Take 2 tablets by mouth daily    Historical Provider, MD   hydrocortisone 1 % cream Apply topically prior to radiation therapy    Historical Provider, MD       Current medications:    Current Facility-Administered Medications   Medication Dose Route Frequency Provider Last Rate Last Admin    0.9 % sodium chloride infusion   Intravenous Continuous Megan Ann        ceFAZolin (ANCEF) 2000 mg in dextrose 5 % 50 mL IVPB  2 g Intravenous 30 Min Pre-Op Megan Ann           Allergies:     Allergies   Allergen Reactions    Bacitracin      Unsure if bacitracin or bactrim - caused her to \"go crazy\" after surgery and made heart race     Bactrim [Sulfamethoxazole-Trimethoprim]      Unsure if bacitracin or bactrim - caused her to \"go crazy\" after surgery and made heart race        Problem List:    Patient Active Problem List   Diagnosis Code    Septic shock ([facility] A41.9, R65.21    Anemia D64.9    Thrombocytopenia ([facility] D69.6 Type & Screen (If Applicable):  No results found for: LABABO, LABRH    Drug/Infectious Status (If Applicable):  No results found for: HIV, HEPCAB    COVID-19 Screening (If Applicable):   Lab Results   Component Value Date    COVID19 detected 12/01/2020         Anesthesia Evaluation  Patient summary reviewed no history of anesthetic complications:   Airway: Mallampati: II  TM distance: >3 FB   Neck ROM: full  Mouth opening: > = 3 FB Dental: normal exam         Pulmonary:normal exam                               Cardiovascular:                      Neuro/Psych:               GI/Hepatic/Renal:             Endo/Other:                     Abdominal:           Vascular:                                        Anesthesia Plan      general     ASA 2       Induction: intravenous. MIPS: Postoperative opioids intended and Prophylactic antiemetics administered. Anesthetic plan and risks discussed with patient and spouse.       Plan discussed with CRNA and surgical team.                  Krystle Cardozo MD   1/20/2021

## 2021-01-20 NOTE — H&P
Cristian Zheng MD  History and Physical    Patient:  Enrique Lopez  MRN: [id number]  YOB: 1945    HISTORY OF PRESENT ILLNESS:     The patient is a 67 y.o. female who presents with kidney stone. Here for procedure. Patient's old records, notes and chart reviewed and summarized above. Cristian Zheng MD independently reviewed the images and verified the radiology reports from:    Ct Abdomen Pelvis Wo Contrast Additional Contrast? None    Result Date: 12/25/2020  PROCEDURE: CT ABDOMEN PELVIS WO CONTRAST CLINICAL INFORMATION: abdominal pain . Covid positive. History of breast carcinoma. Ureteral stent. COMPARISON: None. TECHNIQUE: Axial 5 mm CT images were obtained through the abdomen and pelvis. No contrast was given. Coronal and sagittal reconstructions were obtained. All CT scans at this facility use dose modulation, iterative reconstruction, and/or weight-based dosing when appropriate to reduce radiation dose to as low as reasonably achievable. FINDINGS: There are small bilateral pleural effusions and atelectasis or infiltrate at both lung bases. Heart is within appropriate limits. The liver is grossly normal. The spleen is normal. There is increased density in the region of the left adrenal gland. The right adrenal gland and pancreas are grossly normal. The gallbladder is normal.  There is a left ureteric stent in place. There is increased density in the left perinephric fat and surrounding the tail of the pancreas and extending into the left paracolic gutter suggestive of inflammatory process. No abnormalities of the small bowel loops are noted. The IVC and aorta are of normal caliber. There is no adenopathy. There is a Amaya catheter in place and a small amount of free air within the bladder The urinary bladder is normal. There is a small amount of pelvic free fluid. The colon is grossly normal. There is no adenopathy. There is lumbar spondylosis. 1. Left ureteric stent in place. Increased density in the perinephric fat on the left side extending into the tail of the pancreas and left paracolic gutter suggestive of inflammatory process. 2. Amaya catheter in the bladder. Small amount of free air within the bladder. 3. Small amount of free fluid within the pelvis. 4. Lumbar scoliosis. 5. Small bilateral pleural effusions and atelectasis or infiltrate at both lung bases. **This report has been created using voice recognition software. It may contain minor errors which are inherent in voice recognition technology. ** Final report electronically signed by DR Marlon Alonso on 12/25/2020 11:34 AM    Ct Chest Wo Contrast    Result Date: 12/25/2020  PROCEDURE: CT CHEST WO CONTRAST CLINICAL INFORMATION: covid. COMPARISON: Chest x-ray obtained on the same day. TECHNIQUE: 5 mm noncontrast axial images were obtained through the chest. Sagittal and coronal reconstructions were obtained. All CT scans at this facility use dose modulation, iterative reconstruction, and/or weight-based dosing when appropriate to reduce radiation dose to as low as reasonably achievable. FINDINGS: There are postoperative changes involving the right and left chest walls and bilateral axillary dissection. There is a right breast implant in place. There is a Port-A-Cath overlying the left hemithorax The heart size is normal. The aorta is of normal caliber. There is no gross abnormality of the pulmonary artery. There is no mediastinal, axillary or hilar adenopathy. There are small bilateral pleural effusions and atelectasis or infiltrates at both lung bases. There is increased density in both lung fields There is mild thoracic spondylosis. 1. Postoperative changes involving the right and left chest walls and axillary regions. 2. Right breast implant in place. Left-sided Port-A-Cath. 3. Small bilateral pleural effusion and atelectasis or infiltrates at both lung bases. 4. Slight increased density in both lung fields. 5. Thoracic spondylosis. **This report has been created using voice recognition software. It may contain minor errors which are inherent in voice recognition technology. ** Final report electronically signed by DR Rich Pratt on 12/25/2020 11:25 AM    Xr Chest Portable    Result Date: 12/25/2020  1 view chest x-ray. Comparison: None Findings: Portable AP view. No cardiomegaly. Small left pleural effusion Mild passive venous congestion. Minimal airspace disease both lower lobes. Left Mediport projected at the SVC. Surgical clips both axilla. Osseous structures intact. Impression: 1. Small left pleural effusion. 2.  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 Right 01/15/2019    MASTECTOMY Left 04/07/2020       Medications Prior to Admission:    Prior to Admission medications    Medication Sig Start Date End Date Taking?  Authorizing Provider   carvedilol (COREG) 3.125 MG tablet Take 1 tablet by mouth 2 times daily (with meals) 12/30/20   Izaiah St MD   polyethylene glycol (GLYCOLAX) 17 g packet Take 17 g by mouth daily as needed for Constipation 12/30/20 1/29/21  Jermaine Wagoner MD promethazine (PHENERGAN) 12.5 MG tablet Take 1 tablet by mouth every 6 hours as needed for Nausea 12/30/20   [place] StageMD samuel   Calcium Carb-Cholecalciferol (CALCIUM/VITAMIN D) 500-200 MG-UNIT TABS Take 2 tablets by mouth daily    Historical Provider, MD   hydrocortisone 1 % cream Apply topically prior to radiation therapy    Historical Provider, MD       Allergies:  Bacitracin and Bactrim [sulfamethoxazole-trimethoprim]    Social History:    Social History     Socioeconomic History    Marital status:      Spouse name: Not on file    Number of children: Not on file    Years of education: Not on file    Highest education level: Not on file   Occupational History    Not on file   Social Needs    Financial resource strain: Not on file    Food insecurity     Worry: Not on file     Inability: Not on file    Transportation needs     Medical: Not on file     Non-medical: Not on file   Tobacco Use    Smoking status: Never Smoker   Substance and Sexual Activity    Alcohol use: Not on file    Drug use: Not on file    Sexual activity: Not on file   Lifestyle    Physical activity     Days per week: Not on file     Minutes per session: Not on file    Stress: Not on file   Relationships    Social connections     Talks on phone: Not on file     Gets together: Not on file     Attends Jewish service: Not on file     Active member of club or organization: Not on file     Attends meetings of clubs or organizations: Not on file     Relationship status: Not on file    Intimate partner violence     Fear of current or ex partner: Not on file     Emotionally abused: Not on file     Physically abused: Not on file     Forced sexual activity: Not on file   Other Topics Concern    Not on file   Social History Narrative    Not on file       Family History:  No family history on file.     REVIEW OF SYSTEMS:  Constitutional: negative  Eyes: negative  Respiratory: negative  Cardiovascular: negative Gastrointestinal: negative  Genitourinary: no acute issues  Musculoskeletal: negative  Skin: negative   Neurological: negative  Hematological/Lymphatic: negative  Psychological: negative    Physical Exam:      No data found. Constitutional: Patient in no acute distress; Neuro: alert and oriented to person place and time. Psych: Mood and affect normal.  Skin: Normal  Lungs: Respiratory effort normal, CTA  Cardiovascular:  Normal peripheral pulses; no murmur. Normal rhythm  Abdomen: Soft, non-tender, non-distended with no CVA, flank pain, hepatosplenomegaly or hernia. Kidneys normal.  Bladder non-tender and not distended. LABS:   No results for input(s): WBC, HGB, HCT, MCV, PLT in the last 72 hours. No results for input(s): NA, K, CL, CO2, PHOS, BUN, CREATININE in the last 72 hours. Invalid input(s): CA  No results found for: PSA      Urinalysis: No results for input(s): COLORU, PHUR, LABCAST, WBCUA, RBCUA, MUCUS, TRICHOMONAS, YEAST, BACTERIA, CLARITYU, SPECGRAV, LEUKOCYTESUR, UROBILINOGEN, Revonda Flax in the last 72 hours.     Invalid input(s): NITRATE, GLUCOSEUKETONESUAMORPHOUS     -----------------------------------------------------------------      Assessment and Plan     Impression:    Patient Active Problem List   Diagnosis    Septic shock ([facility]    Anemia    Thrombocytopenia ([facility]       Plan:     Consent obtained; left ureteroscopy in OR today    Rahda Ramirez MD  7:29 AM 1/20/2021

## 2021-01-20 NOTE — FLOWSHEET NOTE
Pt returned to [facility] room 18. Vitals and assessment as charted. 0.9 infusing, @250ml to count from PACU. Pt has coffee, muffin and water. Family at the bedside. Pt and family verbalized understanding of discharge criteria and call light use. Call light in reach.

## 2021-01-21 LAB — MRSA SCREEN: NORMAL

## 2021-01-21 NOTE — PROGRESS NOTES
68 yo female who presented as emergent transfer on olivia day with sepsis secondary to left obstructing ureteral stone and covid+. She was emergently stented and improved clinically and presented today for stone treatment/stent exchange. Consent was inadvertently obtained stating the incorrect side. This was noticed prior to surgery and the appropriate side was addressed. I discussed the incorrect paperwork with the  myself and the patient postoperatively. The patient tolerated the procedure well and will follow up as an outpatient.

## 2021-01-21 NOTE — OP NOTE
Patient:  Jayant Causey  MRN: [id number]  YOB: 1945    FACILITY: [place], [place]    DATE: 1/20/2021    SURGEON: Winnie Easton MD     ASSISTANT: none    PREOPERATIVE DIAGNOSIS: LEFT URETERAL STONE    POSTOPERATIVE DIAGNOSIS: history of kidney stones, sepsis    PROCEDURE PERFORMED: CYSTOSCOPY, LEFT URETEROSCOPY, LEFT URETERAL STENT EXCHANGE    ANESTHESIA: General    ESTIMATED BLOOD LOSS: 0 ml    COMPLICATIONS: None immediate    DRAINS: 6 x 26 double j stent    SPECIMENS: none    INDICATIONS FOR PROCEDURE:  The patient is a 67 y.o. female who presents today with KIDNEY STONE here for CYSTOSCOPY, LEFT URETEROSCOPY, LEFT URETERAL STENT PLACEMENT. After risks, benefits and alternatives of the procedure were discussed with the patient, the patient elected to proceed.      DETAILS OF THE PROCEDURE: The patient was brought back from the preoperative holding area to the operating suite, and was transferred to the operating table where the patient lay in supine position. EPC's were in place, connected to the machine and the machine was turned on before induction. General endotracheal anesthesia was induced, and patient was prepped and draped in standard surgical fashion after being placed in dorsolithotomy position. A proper timeout was performed, preoperative antibiotics were given. Confirmation of the appropriate laterality was performed during time-out. Appropriate notes and imaging studies were referenced prior to surgery. With all parties in agreement, we began by inserting a cystoscope with a 22 Malay sheath and 30 degree lens into the patient's urethral meatus and advancing into the bladder without complication. A pan cystoscopy was performed and the bladder appeared unremarkable. We then focused our attention on the Left ureteral orifice that had a stent in place. We grabbed the stent and pulled it out to the urethral meatus and we cannulated it with our glidewire and advanced the wire up to renal pelvis. We then used a  dual lumen catheter to place a second wire after removing the old stent. Once in good position, we advanced our flexible ureteroscope over the working wire to the renal pelvis under direct visualization. At this time a complete pyeloscopy was performed and no substantial fragments were identified. Evaluation of the distal ureter demonstrated no stones present. The stone had likely passed since emergent stent placement a few weeks prior. We withdrew the ureteroscope and visualized the entire ureter. No stone fragments were identified. No damage to the ureter was identified. At this time, over the remaining glidewire, we placed a 6 x 26 stent in the usual fashion, and we noted appropriate placement in the upper collecting system using fluoroscopy. There was a good curl noted in the bladder. We decided to leave a string at the end of the stent, which was attached with benzoin and steri-strips. The patient's bladder was drained and removed the scope and the procedure was subsequently terminated. I was present for all critical portions of the procedure.     Plan:  Discharge home in good condition  The patient can pull the stent in 5 days

## 2021-01-22 LAB — MRSA SCREEN: NORMAL

## 2021-01-25 DIAGNOSIS — N20.0 KIDNEY STONE: Primary | ICD-10-CM

## 2021-03-09 ENCOUNTER — OFFICE VISIT (OUTPATIENT)
Dept: UROLOGY | Age: 76
End: 2021-03-09
Payer: MEDICARE

## 2021-03-09 VITALS — WEIGHT: 166 LBS | HEIGHT: 66 IN | TEMPERATURE: 97.5 F | BODY MASS INDEX: 26.68 KG/M2

## 2021-03-09 DIAGNOSIS — N20.0 KIDNEY STONES: Primary | ICD-10-CM

## 2021-03-09 PROCEDURE — G8400 PT W/DXA NO RESULTS DOC: HCPCS | Performed by: UROLOGY

## 2021-03-09 PROCEDURE — 1123F ACP DISCUSS/DSCN MKR DOCD: CPT | Performed by: UROLOGY

## 2021-03-09 PROCEDURE — G8484 FLU IMMUNIZE NO ADMIN: HCPCS | Performed by: UROLOGY

## 2021-03-09 PROCEDURE — G8427 DOCREV CUR MEDS BY ELIG CLIN: HCPCS | Performed by: UROLOGY

## 2021-03-09 PROCEDURE — G8417 CALC BMI ABV UP PARAM F/U: HCPCS | Performed by: UROLOGY

## 2021-03-09 PROCEDURE — 1036F TOBACCO NON-USER: CPT | Performed by: UROLOGY

## 2021-03-09 PROCEDURE — 4040F PNEUMOC VAC/ADMIN/RCVD: CPT | Performed by: UROLOGY

## 2021-03-09 PROCEDURE — 99213 OFFICE O/P EST LOW 20 MIN: CPT | Performed by: UROLOGY

## 2021-03-09 PROCEDURE — 1090F PRES/ABSN URINE INCON ASSESS: CPT | Performed by: UROLOGY

## 2021-03-09 NOTE — PROGRESS NOTES
[address] [facility] [address]  Dept: [phone]  Dept Fax: [phone]: [address] Kari Camera, [address] [facility] Urology Office Note     Patient:  Андрей Sanders  YOB: 1945      The patient is a 67 y.o. female who presents today for evaluation of the following problems:   Chief Complaint   Patient presents with    Follow-up    Nephrolithiasis        HISTORY OF PRESENT ILLNESS:     Kidney stones  Hx of sepsis/emergent transfer from [address] [place] [address] well after treatment  No complaints at this time  No new imaging  Here to discuss litholink--- high pH but low urinary citrate (risk of RTA low)        Requested/reviewed records from Oksana Rondon MD office and/or outside physician/EMR    (Patient's old records have been requested, reviewed and pertinent findings summarized in today's note.)    Procedures Today: N/A    Last several PSA's:  No results found for: PSA    Last total testosterone:  No results found for: TESTOSTERONE    Urinalysis today:  No results found for this visit on 03/09/21. Last BUN and creatinine:  Lab Results   Component Value Date    BUN 21 12/30/2020     Lab Results   Component Value Date    CREATININE 0.8 12/30/2020       Imaging Reviewed during this Office Visit:   Serafin Rodriguez MD independently reviewed the images and verified the radiology reports from:    No results found.     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 Right 01/15/2019    MASTECTOMY Left 04/07/2020 No family history on file. Outpatient Medications Marked as Taking for the 3/9/21 encounter (Office Visit) with Faustino Mathews MD   Medication Sig Dispense Refill    Probiotic Product (PROBIOTIC PO) Take by mouth      Calcium Carb-Cholecalciferol (CALCIUM/VITAMIN D) 500-200 MG-UNIT TABS Take 2 tablets by mouth daily         Bacitracin and Bactrim [sulfamethoxazole-trimethoprim]  Social History     Tobacco Use   Smoking Status Never Smoker   Smokeless Tobacco Never Used      (If patient a smoker, smoking cessation counseling offered)   Social History     Substance and Sexual Activity   Alcohol Use Never    Frequency: Never       REVIEW OF SYSTEMS:  Constitutional: negative  Eyes: negative  Respiratory: negative  Cardiovascular: negative  Gastrointestinal: negative  Genitourinary: see HPI  Musculoskeletal: negative  Skin: negative   Neurological: negative  Hematological/Lymphatic: negative  Psychological: negative      Physical Exam:    This a 67 y.o. female  Vitals:    03/09/21 1513   Temp: 97.5 °F (36.4 °C)     Body mass index is 27.2 kg/m². Constitutional: Patient in no acute distress;         Assessment and Plan        1. Kidney stones               Plan:        6 months kub in [facility] office  Dont start any new meds at this time. May consider Thiazide in future but pt is averse to this. She is not a consistent stone former, recommended hydration/general dietary recs      Prescriptions Ordered:  No orders of the defined types were placed in this encounter.      Orders Placed:  Orders Placed This Encounter   Procedures    XR ABDOMEN (KUB) (SINGLE AP VIEW)     Standing Status:   Future     Standing Expiration Date:   3/9/2022            Winnie Easton MD

## 2021-09-15 ENCOUNTER — VIRTUAL VISIT (OUTPATIENT)
Dept: UROLOGY | Age: 76
End: 2021-09-15
Payer: MEDICARE

## 2021-09-15 DIAGNOSIS — N20.0 KIDNEY STONES: Primary | ICD-10-CM

## 2021-09-15 PROCEDURE — 99441 PR PHYS/QHP TELEPHONE EVALUATION 5-10 MIN: CPT | Performed by: NURSE PRACTITIONER

## 2021-09-15 NOTE — PROGRESS NOTES
Jacob Carlson is a 67 y.o. female evaluated via telephone on 9/15/2021. Consent:  She and/or health care decision maker is aware that that she may receive a bill for this telephone service, depending on her insurance coverage, and has provided verbal consent to proceed: Yes      Documentation:  I communicated with the patient and/or health care decision maker about kidney stones. Details of this discussion including any medical advice provided:       Pt has a hx of kidney stones with episode of urosepsis from stone in December of 2020. She underwent cystoscopy with left ureteral stent placement on 12/25/2020 by Dr. Max Martinez and subsequent cystoscopy, left ureteroscopy, left ureteral stent exchange on 1/20/21 by Dr. Max Martinez. Pt's urine calcium was borderline elevated on Litholink and hctz was considered but pt didn't want to start medication yet at this time. Here with KUB:   Tori Silva DO - 09/07/2021    Formatting of this note might be different from the original.  EXAMINATION:  ONE SUPINE XRAY VIEW(S) OF THE ABDOMEN    9/7/2021 8:45 am    COMPARISON:  CT abdomen from 12/23/2020    HISTORY:  HISTORY: Hx kidney stones in December, no current complaints ; Follow up ;    FINDINGS:  No appreciable renal or ureteral calculi seen radiographically. Moderate  stool in the colon with nonobstructive bowel gas pattern. Mild  osteoarthritis of the bilateral hips. Nonaggressive appearing ossific  density over the left iliac bone, likely bone island as seen on CT from  12/23/2020. IMPRESSION  IMPRESSION:  No appreciable renal or ureteral calculi. Images not available for review. Report notes no appreciable calculi. Pt denies flank pain, hematuria, symptoms of kidney stones at this time. Continue to follow kidney stone diet and drink plenty of fluids. F/u in 1 year with KUB a few days prior.       I affirm this is a Patient Initiated Episode with a Patient who has not had a related appointment within my department in the past 7 days or scheduled within the next 24 hours. Patient identification was verified at the start of the visit: Yes    Total Time: minutes: 5-10 minutes    The visit was conducted pursuant to the emergency declaration under the [address] [place] [address], [address] [place] [address] and the [employer] Act. Patient identification was verified, and a caregiver was present when appropriate. The patient was located in a state where the provider was credentialed to provide care.     Note: not billable if this call serves to triage the patient into an appointment for the relevant concern      SHAYLA You - CNP

## (undated) DEVICE — GOWN,SIRUS,NON REINFRCD,LARGE,SET IN SL: Brand: MEDLINE

## (undated) DEVICE — 4-PORT MANIFOLD: Brand: NEPTUNE 2

## (undated) DEVICE — CYSTO PACK: Brand: MEDLINE INDUSTRIES, INC.

## (undated) DEVICE — ELECTRODE PT RET AD L9FT HI MOIST COND ADH HYDRGEL CORDED

## (undated) DEVICE — SINGLE-USE DIGITAL FLEXIBLE URETEROSCOPE: Brand: LITHOVUE

## (undated) DEVICE — GLOVE ORANGE PI 7 1/2   MSG9075

## (undated) DEVICE — GLOVE ORANGE PI 7   MSG9070

## (undated) DEVICE — Z DISCONTINUED USE 2139346 GUIDEWIRE KAYAK URO STIFF 0.035 IN

## (undated) DEVICE — COVER ARMBRD W13XL28.5IN IMPERV BLU FOR OP RM

## (undated) DEVICE — SINGLE ACTION PUMPING SYSTEM

## (undated) DEVICE — SOLUTION SCRB 4OZ 4% CHG H2O AIDED FOR PREOPERATIVE SKIN

## (undated) DEVICE — GUIDEWIRE ENDOSCP L150CM DIA0.035IN TIP L3CM PTFE STR FLX

## (undated) DEVICE — SPONGE GZ W4XL4IN COT 12 PLY TYP VII WVN C FLD DSGN

## (undated) DEVICE — DRAINBAG,ANTI-REFLUX TOWER,L/F,2000ML,LL: Brand: MEDLINE

## (undated) DEVICE — GLOVE SURG SZ 65 THK91MIL LTX FREE SYN POLYISOPRENE

## (undated) DEVICE — FLEXI-TIP, DUAL LUMEN URETERAL ACCESS CATHETER: Brand: FLEXI-TIP

## (undated) DEVICE — NITINOL STONE RETRIEVAL BASKET: Brand: ZERO TIP

## (undated) DEVICE — Device